# Patient Record
Sex: MALE | Race: WHITE | NOT HISPANIC OR LATINO | Employment: OTHER | URBAN - METROPOLITAN AREA
[De-identification: names, ages, dates, MRNs, and addresses within clinical notes are randomized per-mention and may not be internally consistent; named-entity substitution may affect disease eponyms.]

---

## 2017-08-05 LAB
CREAT ?TM UR-SCNC: 194 UMOL/L
HBA1C MFR BLD HPLC: 7.3 %
MICROALBUMIN UR-MCNC: 0.8 MG/L (ref 0–20)
MICROALBUMIN/CREAT UR: 4 MG/G{CREAT}

## 2017-12-14 LAB — HBA1C MFR BLD HPLC: 7.7 %

## 2018-07-02 DIAGNOSIS — E11.8 TYPE 2 DIABETES MELLITUS WITH COMPLICATION, UNSPECIFIED WHETHER LONG TERM INSULIN USE: Primary | ICD-10-CM

## 2018-07-02 NOTE — TELEPHONE ENCOUNTER
Pt stopped in office to get renewal of Janumet tabs 50/1000 (NOT TIMED RELEASE) take 1 tablet 2x per day sent to [facility] 90 day supply  Appt 9/10/18 with Dr Lakshmi Vazquez   Thanks

## 2018-09-10 ENCOUNTER — OFFICE VISIT (OUTPATIENT)
Dept: ENDOCRINOLOGY | Facility: HOSPITAL | Age: 68
End: 2018-09-10
Payer: MEDICARE

## 2018-09-10 VITALS
HEIGHT: 72 IN | HEART RATE: 72 BPM | DIASTOLIC BLOOD PRESSURE: 88 MMHG | BODY MASS INDEX: 30.88 KG/M2 | SYSTOLIC BLOOD PRESSURE: 140 MMHG | WEIGHT: 228 LBS

## 2018-09-10 DIAGNOSIS — E11.65 TYPE 2 DIABETES MELLITUS WITH HYPERGLYCEMIA, WITHOUT LONG-TERM CURRENT USE OF INSULIN (HCC): Primary | ICD-10-CM

## 2018-09-10 DIAGNOSIS — E11.42 DIABETIC POLYNEUROPATHY ASSOCIATED WITH TYPE 2 DIABETES MELLITUS (HCC): ICD-10-CM

## 2018-09-10 DIAGNOSIS — E11.8 TYPE 2 DIABETES MELLITUS WITH COMPLICATION, UNSPECIFIED WHETHER LONG TERM INSULIN USE: ICD-10-CM

## 2018-09-10 PROBLEM — I10 HYPERTENSION: Status: ACTIVE | Noted: 2018-09-10

## 2018-09-10 PROCEDURE — 99204 OFFICE O/P NEW MOD 45 MIN: CPT | Performed by: INTERNAL MEDICINE

## 2018-09-10 RX ORDER — CHLORAL HYDRATE 500 MG
1000 CAPSULE ORAL DAILY
COMMUNITY

## 2018-09-10 RX ORDER — AMLODIPINE BESYLATE 5 MG/1
5 TABLET ORAL 2 TIMES DAILY
COMMUNITY

## 2018-09-10 NOTE — LETTER
September 10, 2018     Darrick Sidhu MD  [address] [facility] [address]    Patient: Cody Tsai   YOB: 1950   Date of Visit: 9/10/2018       Dear Dr Sisto Brunner: Thank you for referring Cody Tsai to me for evaluation  Below are my notes for this consultation  If you have questions, please do not hesitate to call me  I look forward to following your patient along with you  Sincerely,        Abundio Meyer MD        CC: No Recipients  Abundio Meyer MD  9/10/2018  4:35 PM  Sign at close encounter  9/10/2018    Assessment/Plan      Diagnoses and all orders for this visit:    Type 2 diabetes mellitus with hyperglycemia, without long-term current use of insulin (HCC)  -     HEMOGLOBIN A1C W/ EAG ESTIMATION Lab Collect  -     Comprehensive metabolic panel Lab Collect  -     TSH, 3rd generation Lab Collect  -     Microalbumin / creatinine urine ratio Lab Collect  -     sitaGLIPtin-metFORMIN (JANUMET)  MG per tablet; Take 1 tablet by mouth daily at bedtime  -     ESTEFANI CONTOUR TEST test strip; Use as instructed    Type 2 diabetes mellitus with complication, unspecified whether long term insulin use (HCC)  -     sitaGLIPtin-metFORMIN (JANUMET)  MG per tablet; Take 1 tablet by mouth daily in the early morning    Diabetic polyneuropathy associated with type 2 diabetes mellitus ([facility] [address] )    Other orders  -     Discontinue: sitaGLIPtin-metFORMIN (JANUMET)  MG per tablet; Take 1 tablet by mouth daily at bedtime  -     amLODIPine (NORVASC) 5 mg tablet; Take 5 mg by mouth daily  -     Probiotic Product (PROBIOTIC-10 PO); Take by mouth daily    -     Omega-3 Fatty Acids (FISH OIL) 1,000 mg; Take 1,000 mg by mouth daily  -     Multiple Vitamin (MULTI-VITAMIN DAILY PO); Take by mouth daily          Assessment/Plan:  1  Type 2 diabetes  I have no recent blood work  [facility]    I will do a hemoglobin A1c, CMP, TSH, and urine microalbumin to creatinine ratio now  He will call within 1 week after the blood work for the results  We will adjust any medications as needed  He will continue to check his blood sugars once a day  I encouraged him to exercise at least 15-20 minutes 3-4 days a week  I asked him to be sure that when he does have a dessert that his portion of dessert is a small portion and not a larger portion in that he does not get 2 servings  I have asked him to increase his water intake to 6-8 8 oz glasses of water a day to keep his blood sugars down and improve his fatigue  2   Diabetic neuropathy  He has no symptoms that he complains of, just decreased vibration sensation on exam   I will follow this over time  I have asked him to follow up in 3 months and blood work will be determined based on the blood work that he does now  CC: Diabetes Consult    History of Present Illness     HPI: Jacques Hopper is a 79y o  year old male with type 2 diabetes for 18 years  He was previously seen by Dr Orr and is here for transition of care  He was diagnosed with diabetes in 2000 after his dog passed away  He had no treatment until 2008 when he had significant symptoms including dizziness  He was first placed on metformin, dose was adjusted and now he is taking Januvia too  He was never on insulin  He tried actos and it caused significant GI issues  He is on oral agents at home and takes Janumet 50/1000 mg in the morning and Janumet 50/500 mg in the evening  He denies any polyphagia, polydipsia, nocturia and blurry vision  He has polyuria  He denies numbness or tingling of the feet  He denies chest pain or shortness of breath  He denies neuropathy, nephropathy, retinopathy, heart attack, stroke and claudication but does admit to none  He has neck and throat pain and achiness, he says he felt 2 bones in throat  He has a cough for the last 6 months  His PCP ordered a thyroid ultrasound and chest x ray    Eats a healthy diet, one dessert a day  Not many carbs  Hypoglycemic episodes: No never  H/o of hypoglycemia causing hospitalization or intervention such as glucagon injection  or ambulance call  No   Hypoglycemia symptoms: never had one  Treatment of hypoglycemia:  Would drink juice or eat food  Glucagon:No   Medic alert tag: recommended,Yes  The patient's last eye exam was in July 2017 with no retinopathy  The patient's last foot exam was in not seen  Last A1C was done in December of 2017 and was  Lab Results   Component Value Date    HGBA1C 7 7 12/14/2017     Blood Sugar/Glucometer/Pump/CGM review: checks blood sugars once a day in am  NO record brought with him today  Before breakfast: 150- high 200s  Before lunch:   Before dinner:   Bedtime:     Review of Systems   Constitutional: Positive for fatigue  Negative for unexpected weight change  Gets tired at night  Not much exercise  HENT: Negative for hearing loss, tinnitus and trouble swallowing  No XRT to the head or neck in the past    Eyes: Negative for visual disturbance  Some blurry vision  Respiratory: Positive for cough  Negative for chest tightness and shortness of breath  Cough for 6 months  Cardiovascular: Negative for chest pain  Gastrointestinal: Positive for abdominal distention  Negative for abdominal pain, constipation, diarrhea and nausea  Always feeling bloated and upper abdomen uncomfortable  Endocrine: Positive for polyuria  Negative for polydipsia and polyphagia  No nocturia  Musculoskeletal: Positive for back pain  Negative for arthralgias  Some low back pain  Skin: Negative for rash and wound  Neurological: Positive for dizziness  Negative for tremors, light-headedness, numbness and headaches  Dizzy at times  Some tremors at times  Psychiatric/Behavioral: Negative for dysphoric mood and sleep disturbance  The patient is not nervous/anxious           Will wake early at times        Historical Information   Past Medical History:   Diagnosis Date    Hiatal hernia      No past surgical history on file  Social History   History   Alcohol Use    Yes     Comment: social     History   Drug Use No     History   Smoking Status    Former Smoker    Packs/day: 0 10    Types: Cigarettes    Quit date: 2018   Smokeless Tobacco    Never Used     Family History:   Family History   Problem Relation Age of Onset    Hypertension Mother     Heart disease Father         lung collapsed, several heart attacks    Diabetes type II Sister     Hypertension Sister     Diabetes type II Brother     Heart disease Brother     Hypertension Brother     Diabetes type II Sister     Hypertension Sister     Lucio's disease Sister     Hypertension Sister     Hypertension Sister     Hypertension Sister        Meds/Allergies   Current Outpatient Prescriptions   Medication Sig Dispense Refill    amLODIPine (NORVASC) 5 mg tablet Take 5 mg by mouth daily      Multiple Vitamin (MULTI-VITAMIN DAILY PO) Take by mouth daily        Omega-3 Fatty Acids (FISH OIL) 1,000 mg Take 1,000 mg by mouth daily      Probiotic Product (PROBIOTIC-10 PO) Take by mouth daily        sitaGLIPtin-metFORMIN (JANUMET)  MG per tablet Take 1 tablet by mouth daily in the early morning 90 tablet 3    sitaGLIPtin-metFORMIN (JANUMET)  MG per tablet Take 1 tablet by mouth daily at bedtime 90 tablet 3    ESTEFANI CONTOUR TEST test strip Use as instructed 100 each 3     No current facility-administered medications for this visit  Allergies   Allergen Reactions    Actos [Pioglitazone] GI Intolerance     Significant GI upset       Objective   Vitals: Blood pressure 140/88, pulse 72, height 6' (1 829 m), weight 103 kg (228 lb)  Invasive Devices          No matching active lines, drains, or airways          Physical Exam   Constitutional: He is oriented to person, place, and time   He appears well-developed and well-nourished  HENT:   Head: Normocephalic and atraumatic  Eyes: Conjunctivae and EOM are normal  Pupils are equal, round, and reactive to light  No lid lag, stare, proptosis, or periorbital edema  Neck: Normal range of motion  Neck supple  No thyromegaly present  No carotid bruits  Cardiovascular: Normal rate, regular rhythm, normal heart sounds and intact distal pulses  Pulses are no weak pulses  No murmur heard  Pulses:       Dorsalis pedis pulses are 2+ on the right side, and 2+ on the left side  Posterior tibial pulses are 2+ on the right side, and 2+ on the left side  Pulmonary/Chest: Effort normal and breath sounds normal  He has no wheezes  Abdominal: Soft  Bowel sounds are normal  There is no tenderness  Musculoskeletal: Normal range of motion  He exhibits no edema or deformity  Callus medial 1st toe bilateral   No ulceration of the feet when examined with shoes and socks removed  No tremor of the outstretched hands  No spinous process tenderness  No CVA tenderness  Feet:   Right Foot:   Skin Integrity: Positive for callus  Negative for ulcer, skin breakdown, erythema, warmth or dry skin  Left Foot:   Skin Integrity: Positive for callus  Negative for ulcer, skin breakdown, erythema, warmth or dry skin  Lymphadenopathy:     He has no cervical adenopathy  Neurological: He is alert and oriented to person, place, and time  He has normal reflexes  Vibration sensation diminished to the bilateral 1st toe DIP joint  Microfilament sensation intact bilaterally except decreased to the heels  Achilles tendon reflex intact bilaterally  Skin: Skin is warm and dry  No rash noted  No erythema  Vitals reviewed  Patient's shoes and socks removed  Right Foot/Ankle   Right Foot Inspection  Skin Exam: skin normal, skin intact, callus and callus no dry skin, no warmth, no erythema, no maceration, no abnormal color, no pre-ulcer and no ulcer                          Toe Exam: ROM and strength within normal limits  Sensory   Vibration: diminished  Proprioception: intact   Monofilament testing: intact  Vascular  Capillary refills: < 3 seconds  The right DP pulse is 2+  The right PT pulse is 2+  Left Foot/Ankle  Left Foot Inspection  Skin Exam: skin normal, skin intact and callusno dry skin, no warmth, no erythema, no maceration, normal color, no pre-ulcer and no ulcer                         Toe Exam: ROM and strength within normal limits                   Sensory   Vibration: diminished  Proprioception: intact  Monofilament: intact  Vascular  Capillary refills: < 3 seconds  The left DP pulse is 2+  The left PT pulse is 2+  Assign Risk Category:  No deformity present; No loss of protective sensation; No weak pulses       Risk: 0      The history was obtained from the review of the chart and from the patient and wife  Lab Results:    Most recent Alc is  Lab Results   Component Value Date    HGBA1C 7 7 12/14/2017               Recent Results (from the past 85792 hour(s))   HEMOGLOBIN A1C W/ EAG ESTIMATION    Collection Time: 08/05/17 12:00 AM   Result Value Ref Range    Hemoglobin A1C 7 3    Microalbumin / creatinine urine ratio    Collection Time: 08/05/17 12:00 AM   Result Value Ref Range    EXT Creatinine Urine 194     Microalbum  ,U,Random 0 8 0 0 - 20 0 mg/L    EXTERNAL Microalb/Creat Ratio 4    HEMOGLOBIN A1C W/ EAG ESTIMATION    Collection Time: 12/14/17 12:00 AM   Result Value Ref Range    Hemoglobin A1C 7 7          Future Appointments  Date Time Provider Lloyd Gee   1/8/2019 1:40 PM Shannon Chavis MD [facility]

## 2018-09-10 NOTE — PROGRESS NOTES
9/10/2018    Assessment/Plan      Diagnoses and all orders for this visit:    Type 2 diabetes mellitus with hyperglycemia, without long-term current use of insulin (HCC)  -     HEMOGLOBIN A1C W/ EAG ESTIMATION Lab Collect  -     Comprehensive metabolic panel Lab Collect  -     TSH, 3rd generation Lab Collect  -     Microalbumin / creatinine urine ratio Lab Collect  -     sitaGLIPtin-metFORMIN (JANUMET)  MG per tablet; Take 1 tablet by mouth daily at bedtime  -     ESTEFANI CONTOUR TEST test strip; Use as instructed    Type 2 diabetes mellitus with complication, unspecified whether long term insulin use (HCC)  -     sitaGLIPtin-metFORMIN (JANUMET)  MG per tablet; Take 1 tablet by mouth daily in the early morning    Diabetic polyneuropathy associated with type 2 diabetes mellitus ([facility] [address] )    Other orders  -     Discontinue: sitaGLIPtin-metFORMIN (JANUMET)  MG per tablet; Take 1 tablet by mouth daily at bedtime  -     amLODIPine (NORVASC) 5 mg tablet; Take 5 mg by mouth daily  -     Probiotic Product (PROBIOTIC-10 PO); Take by mouth daily    -     Omega-3 Fatty Acids (FISH OIL) 1,000 mg; Take 1,000 mg by mouth daily  -     Multiple Vitamin (MULTI-VITAMIN DAILY PO); Take by mouth daily          Assessment/Plan:  1  Type 2 diabetes  I have no recent blood work  [facility] now, [facility]  I will do a hemoglobin A1c, CMP, TSH, and urine microalbumin to creatinine ratio now  He will call within 1 week after the blood work for the results  We will adjust any medications as needed  He will continue to check his blood sugars once a day  I encouraged him to exercise at least 15-20 minutes 3-4 days a week  I asked him to be sure that when he does have a dessert that his portion of dessert is a small portion and not a larger portion in that he does not get 2 servings    I have asked him to increase his water intake to 6-8 8 oz glasses of water a day to keep his blood sugars down and improve his fatigue  2   Diabetic neuropathy  He has no symptoms that he complains of, just decreased vibration sensation on exam   I will follow this over time  I have asked him to follow up in 3 months and blood work will be determined based on the blood work that he does now  CC: Diabetes Consult    History of Present Illness     HPI: Mukund Fine is a 79y o  year old male with type 2 diabetes for 18 years  He was previously seen by Dr Orr and is here for transition of care  He was diagnosed with diabetes in 2000 after his dog passed away  He had no treatment until 2008 when he had significant symptoms including dizziness  He was first placed on metformin, dose was adjusted and now he is taking Januvia too  He was never on insulin  He tried actos and it caused significant GI issues  He is on oral agents at home and takes Janumet 50/1000 mg in the morning and Janumet 50/500 mg in the evening  He denies any polyphagia, polydipsia, nocturia and blurry vision  He has polyuria  He denies numbness or tingling of the feet  He denies chest pain or shortness of breath  He denies neuropathy, nephropathy, retinopathy, heart attack, stroke and claudication but does admit to none  He has neck and throat pain and achiness, he says he felt 2 bones in throat  He has a cough for the last 6 months  His PCP ordered a thyroid ultrasound and chest x ray  Eats a healthy diet, one dessert a day  Not many carbs  Hypoglycemic episodes: No never  H/o of hypoglycemia causing hospitalization or intervention such as glucagon injection  or ambulance call  No   Hypoglycemia symptoms: never had one  Treatment of hypoglycemia:  Would drink juice or eat food  Glucagon:No   Medic alert tag: recommended,Yes  The patient's last eye exam was in July 2017 with no retinopathy  The patient's last foot exam was in not seen   Last A1C was done in December of 2017 and was  Lab Results   Component Value Date    HGBA1C 7 7 12/14/2017     Blood Sugar/Glucometer/Pump/CGM review: checks blood sugars once a day in am  NO record brought with him today  Before breakfast: 150- high 200s  Before lunch:   Before dinner:   Bedtime:     Review of Systems   Constitutional: Positive for fatigue  Negative for unexpected weight change  Gets tired at night  Not much exercise  HENT: Negative for hearing loss, tinnitus and trouble swallowing  No XRT to the head or neck in the past    Eyes: Negative for visual disturbance  Some blurry vision  Respiratory: Positive for cough  Negative for chest tightness and shortness of breath  Cough for 6 months  Cardiovascular: Negative for chest pain  Gastrointestinal: Positive for abdominal distention  Negative for abdominal pain, constipation, diarrhea and nausea  Always feeling bloated and upper abdomen uncomfortable  Endocrine: Positive for polyuria  Negative for polydipsia and polyphagia  No nocturia  Musculoskeletal: Positive for back pain  Negative for arthralgias  Some low back pain  Skin: Negative for rash and wound  Neurological: Positive for dizziness  Negative for tremors, light-headedness, numbness and headaches  Dizzy at times  Some tremors at times  Psychiatric/Behavioral: Negative for dysphoric mood and sleep disturbance  The patient is not nervous/anxious  Will wake early at times  Historical Information   Past Medical History:   Diagnosis Date    Hiatal hernia      No past surgical history on file    Social History   History   Alcohol Use    Yes     Comment: social     History   Drug Use No     History   Smoking Status    Former Smoker    Packs/day: 0 10    Types: Cigarettes    Quit date: 2018   Smokeless Tobacco    Never Used     Family History:   Family History   Problem Relation Age of Onset    Hypertension Mother     Heart disease Father         lung collapsed, several heart attacks    Diabetes type II Sister     Hypertension Sister     Diabetes type II Brother     Heart disease Brother     Hypertension Brother     Diabetes type II Sister     Hypertension Sister     Lucio's disease Sister     Hypertension Sister     Hypertension Sister     Hypertension Sister        Meds/Allergies   Current Outpatient Prescriptions   Medication Sig Dispense Refill    amLODIPine (NORVASC) 5 mg tablet Take 5 mg by mouth daily      Multiple Vitamin (MULTI-VITAMIN DAILY PO) Take by mouth daily        Omega-3 Fatty Acids (FISH OIL) 1,000 mg Take 1,000 mg by mouth daily      Probiotic Product (PROBIOTIC-10 PO) Take by mouth daily        sitaGLIPtin-metFORMIN (JANUMET)  MG per tablet Take 1 tablet by mouth daily in the early morning 90 tablet 3    sitaGLIPtin-metFORMIN (JANUMET)  MG per tablet Take 1 tablet by mouth daily at bedtime 90 tablet 3    ESTEFANI CONTOUR TEST test strip Use as instructed 100 each 3     No current facility-administered medications for this visit  Allergies   Allergen Reactions    Actos [Pioglitazone] GI Intolerance     Significant GI upset       Objective   Vitals: Blood pressure 140/88, pulse 72, height 6' (1 829 m), weight 103 kg (228 lb)  Invasive Devices          No matching active lines, drains, or airways          Physical Exam   Constitutional: He is oriented to person, place, and time  He appears well-developed and well-nourished  HENT:   Head: Normocephalic and atraumatic  Eyes: Conjunctivae and EOM are normal  Pupils are equal, round, and reactive to light  No lid lag, stare, proptosis, or periorbital edema  Neck: Normal range of motion  Neck supple  No thyromegaly present  No carotid bruits  Cardiovascular: Normal rate, regular rhythm, normal heart sounds and intact distal pulses  Pulses are no weak pulses  No murmur heard  Pulses:       Dorsalis pedis pulses are 2+ on the right side, and 2+ on the left side          Posterior tibial pulses are 2+ on the right side, and 2+ on the left side  Pulmonary/Chest: Effort normal and breath sounds normal  He has no wheezes  Abdominal: Soft  Bowel sounds are normal  There is no tenderness  Musculoskeletal: Normal range of motion  He exhibits no edema or deformity  Callus medial 1st toe bilateral   No ulceration of the feet when examined with shoes and socks removed  No tremor of the outstretched hands  No spinous process tenderness  No CVA tenderness  Feet:   Right Foot:   Skin Integrity: Positive for callus  Negative for ulcer, skin breakdown, erythema, warmth or dry skin  Left Foot:   Skin Integrity: Positive for callus  Negative for ulcer, skin breakdown, erythema, warmth or dry skin  Lymphadenopathy:     He has no cervical adenopathy  Neurological: He is alert and oriented to person, place, and time  He has normal reflexes  Vibration sensation diminished to the bilateral 1st toe DIP joint  Microfilament sensation intact bilaterally except decreased to the heels  Achilles tendon reflex intact bilaterally  Skin: Skin is warm and dry  No rash noted  No erythema  Vitals reviewed  Patient's shoes and socks removed  Right Foot/Ankle   Right Foot Inspection  Skin Exam: skin normal, skin intact, callus and callus no dry skin, no warmth, no erythema, no maceration, no abnormal color, no pre-ulcer and no ulcer                          Toe Exam: ROM and strength within normal limits  Sensory   Vibration: diminished  Proprioception: intact   Monofilament testing: intact  Vascular  Capillary refills: < 3 seconds  The right DP pulse is 2+  The right PT pulse is 2+       Left Foot/Ankle  Left Foot Inspection  Skin Exam: skin normal, skin intact and callusno dry skin, no warmth, no erythema, no maceration, normal color, no pre-ulcer and no ulcer                         Toe Exam: ROM and strength within normal limits                   Sensory   Vibration: diminished  Proprioception: intact  Monofilament: intact  Vascular  Capillary refills: < 3 seconds  The left DP pulse is 2+  The left PT pulse is 2+  Assign Risk Category:  No deformity present; No loss of protective sensation; No weak pulses       Risk: 0      The history was obtained from the review of the chart and from the patient and wife  Lab Results:    Most recent Alc is  Lab Results   Component Value Date    HGBA1C 7 7 12/14/2017               Recent Results (from the past 75915 hour(s))   HEMOGLOBIN A1C W/ EAG ESTIMATION    Collection Time: 08/05/17 12:00 AM   Result Value Ref Range    Hemoglobin A1C 7 3    Microalbumin / creatinine urine ratio    Collection Time: 08/05/17 12:00 AM   Result Value Ref Range    EXT Creatinine Urine 194     Microalbum  ,U,Random 0 8 0 0 - 20 0 mg/L    EXTERNAL Microalb/Creat Ratio 4    HEMOGLOBIN A1C W/ EAG ESTIMATION    Collection Time: 12/14/17 12:00 AM   Result Value Ref Range    Hemoglobin A1C 7 7          Future Appointments  Date Time Provider Lloyd Gee   1/8/2019 1:40 PM Aida Faith MD [facility]

## 2018-09-10 NOTE — PATIENT INSTRUCTIONS
Do blood work now  Call within 1 week of the blood work for results  Continue the same Janumet  Work on exercise 15-30 min 3-4 times a week  Work on 6-8 8 ounce glasses of water a day  Keep sweets portions size smaller  Follow in 4 months with blood work

## 2018-09-14 LAB
ALBUMIN SERPL-MCNC: 4.5 G/DL (ref 3.6–5.1)
ALBUMIN/CREAT UR: 4 MCG/MG CREAT
ALBUMIN/GLOB SERPL: 1.9 (CALC) (ref 1–2.5)
ALP SERPL-CCNC: 56 U/L (ref 40–115)
ALT SERPL-CCNC: 30 U/L (ref 9–46)
AST SERPL-CCNC: 20 U/L (ref 10–35)
BILIRUB SERPL-MCNC: 0.5 MG/DL (ref 0.2–1.2)
BUN SERPL-MCNC: 11 MG/DL (ref 7–25)
BUN/CREAT SERPL: ABNORMAL (CALC) (ref 6–22)
CALCIUM SERPL-MCNC: 9.6 MG/DL (ref 8.6–10.3)
CHLORIDE SERPL-SCNC: 103 MMOL/L (ref 98–110)
CO2 SERPL-SCNC: 27 MMOL/L (ref 20–32)
CREAT SERPL-MCNC: 0.83 MG/DL (ref 0.7–1.25)
CREAT UR-MCNC: 71 MG/DL (ref 20–320)
EST. AVERAGE GLUCOSE BLD GHB EST-MCNC: 157 (CALC)
EST. AVERAGE GLUCOSE BLD GHB EST-SCNC: 8.7 (CALC)
GLOBULIN SER CALC-MCNC: 2.4 G/DL (CALC) (ref 1.9–3.7)
GLUCOSE SERPL-MCNC: 156 MG/DL (ref 65–99)
HBA1C MFR BLD: 7.1 % OF TOTAL HGB
MICROALBUMIN UR-MCNC: 0.3 MG/DL
POTASSIUM SERPL-SCNC: 4.2 MMOL/L (ref 3.5–5.3)
PROT SERPL-MCNC: 6.9 G/DL (ref 6.1–8.1)
SL AMB EGFR AFRICAN AMERICAN: 106 ML/MIN/1.73M2
SL AMB EGFR NON AFRICAN AMERICAN: 92 ML/MIN/1.73M2
SODIUM SERPL-SCNC: 137 MMOL/L (ref 135–146)
TSH SERPL-ACNC: 5.71 MIU/L (ref 0.4–4.5)

## 2018-09-17 ENCOUNTER — TELEPHONE (OUTPATIENT)
Dept: ENDOCRINOLOGY | Facility: HOSPITAL | Age: 68
End: 2018-09-17

## 2018-10-31 DIAGNOSIS — E11.8 TYPE 2 DIABETES MELLITUS WITH COMPLICATION, UNSPECIFIED WHETHER LONG TERM INSULIN USE: Primary | ICD-10-CM

## 2018-10-31 NOTE — TELEPHONE ENCOUNTER
Pt stopped by the office today said that he needs a new script for onetouch ultra strips, he test 2x a day and would like a 90 day supply sent to [facility] in [facility]

## 2019-01-31 DIAGNOSIS — E11.8 TYPE 2 DIABETES MELLITUS WITH COMPLICATION, UNSPECIFIED WHETHER LONG TERM INSULIN USE: ICD-10-CM

## 2019-03-26 ENCOUNTER — TELEPHONE (OUTPATIENT)
Dept: ENDOCRINOLOGY | Facility: HOSPITAL | Age: 69
End: 2019-03-26

## 2019-03-26 DIAGNOSIS — E11.65 TYPE 2 DIABETES MELLITUS WITH HYPERGLYCEMIA, WITHOUT LONG-TERM CURRENT USE OF INSULIN (HCC): Primary | ICD-10-CM

## 2019-03-26 DIAGNOSIS — E11.42 DIABETIC POLYNEUROPATHY ASSOCIATED WITH TYPE 2 DIABETES MELLITUS (HCC): ICD-10-CM

## 2019-03-26 DIAGNOSIS — R79.89 ELEVATED TSH: ICD-10-CM

## 2019-03-26 DIAGNOSIS — I10 ESSENTIAL HYPERTENSION: ICD-10-CM

## 2019-03-26 NOTE — TELEPHONE ENCOUNTER
Pt has an appt with you on 4/2  Wife wants to know what labs you want ordered  Pt has previous labs in Epic

## 2019-04-01 LAB
ALBUMIN SERPL-MCNC: 4.6 G/DL (ref 3.6–5.1)
ALBUMIN/GLOB SERPL: 1.8 (CALC) (ref 1–2.5)
ALP SERPL-CCNC: 59 U/L (ref 40–115)
ALT SERPL-CCNC: 22 U/L (ref 9–46)
AST SERPL-CCNC: 17 U/L (ref 10–35)
BILIRUB SERPL-MCNC: 0.5 MG/DL (ref 0.2–1.2)
BUN SERPL-MCNC: 13 MG/DL (ref 7–25)
BUN/CREAT SERPL: ABNORMAL (CALC) (ref 6–22)
CALCIUM SERPL-MCNC: 9.8 MG/DL (ref 8.6–10.3)
CHLORIDE SERPL-SCNC: 105 MMOL/L (ref 98–110)
CHOLEST SERPL-MCNC: 170 MG/DL
CHOLEST/HDLC SERPL: 4.4 (CALC)
CO2 SERPL-SCNC: 25 MMOL/L (ref 20–32)
CREAT SERPL-MCNC: 0.86 MG/DL (ref 0.7–1.25)
EST. AVERAGE GLUCOSE BLD GHB EST-MCNC: 186 (CALC)
EST. AVERAGE GLUCOSE BLD GHB EST-SCNC: 10.3 (CALC)
GLOBULIN SER CALC-MCNC: 2.5 G/DL (CALC) (ref 1.9–3.7)
GLUCOSE SERPL-MCNC: 170 MG/DL (ref 65–99)
HBA1C MFR BLD: 8.1 % OF TOTAL HGB
HDLC SERPL-MCNC: 39 MG/DL
LDLC SERPL CALC-MCNC: 109 MG/DL (CALC)
NONHDLC SERPL-MCNC: 131 MG/DL (CALC)
POTASSIUM SERPL-SCNC: 4.4 MMOL/L (ref 3.5–5.3)
PROT SERPL-MCNC: 7.1 G/DL (ref 6.1–8.1)
SL AMB EGFR AFRICAN AMERICAN: 105 ML/MIN/1.73M2
SL AMB EGFR NON AFRICAN AMERICAN: 90 ML/MIN/1.73M2
SODIUM SERPL-SCNC: 139 MMOL/L (ref 135–146)
T4 FREE SERPL-MCNC: 1.2 NG/DL (ref 0.8–1.8)
THYROGLOB AB SERPL-ACNC: 24 IU/ML
THYROPEROXIDASE AB SERPL-ACNC: 9 IU/ML
TRIGL SERPL-MCNC: 110 MG/DL
TSH SERPL-ACNC: 4.21 MIU/L (ref 0.4–4.5)

## 2019-04-02 ENCOUNTER — OFFICE VISIT (OUTPATIENT)
Dept: ENDOCRINOLOGY | Facility: HOSPITAL | Age: 69
End: 2019-04-02
Payer: MEDICARE

## 2019-04-02 VITALS
HEART RATE: 68 BPM | BODY MASS INDEX: 30.8 KG/M2 | DIASTOLIC BLOOD PRESSURE: 68 MMHG | SYSTOLIC BLOOD PRESSURE: 136 MMHG | HEIGHT: 72 IN | WEIGHT: 227.4 LBS

## 2019-04-02 DIAGNOSIS — R79.89 ELEVATED TSH: ICD-10-CM

## 2019-04-02 DIAGNOSIS — I10 ESSENTIAL HYPERTENSION: ICD-10-CM

## 2019-04-02 DIAGNOSIS — E11.65 TYPE 2 DIABETES MELLITUS WITH HYPERGLYCEMIA, WITHOUT LONG-TERM CURRENT USE OF INSULIN (HCC): Primary | ICD-10-CM

## 2019-04-02 DIAGNOSIS — E11.42 DIABETIC POLYNEUROPATHY ASSOCIATED WITH TYPE 2 DIABETES MELLITUS (HCC): ICD-10-CM

## 2019-04-02 DIAGNOSIS — E06.3 HASHIMOTO'S THYROIDITIS: ICD-10-CM

## 2019-04-02 DIAGNOSIS — E11.8 TYPE 2 DIABETES MELLITUS WITH COMPLICATION, UNSPECIFIED WHETHER LONG TERM INSULIN USE: ICD-10-CM

## 2019-04-02 PROCEDURE — 99215 OFFICE O/P EST HI 40 MIN: CPT | Performed by: INTERNAL MEDICINE

## 2019-04-02 RX ORDER — LECITHIN/GINKGO/S.GINSENG/GOTU 50-150-250
TABLET ORAL
COMMUNITY
End: 2019-07-10 | Stop reason: ALTCHOICE

## 2019-05-20 DIAGNOSIS — E11.8 TYPE 2 DIABETES MELLITUS WITH COMPLICATION, UNSPECIFIED WHETHER LONG TERM INSULIN USE: ICD-10-CM

## 2019-07-10 ENCOUNTER — OFFICE VISIT (OUTPATIENT)
Dept: ENDOCRINOLOGY | Facility: HOSPITAL | Age: 69
End: 2019-07-10
Payer: MEDICARE

## 2019-07-10 VITALS
SYSTOLIC BLOOD PRESSURE: 134 MMHG | BODY MASS INDEX: 30.58 KG/M2 | DIASTOLIC BLOOD PRESSURE: 82 MMHG | WEIGHT: 225.8 LBS | HEIGHT: 72 IN | HEART RATE: 67 BPM

## 2019-07-10 DIAGNOSIS — E11.65 TYPE 2 DIABETES MELLITUS WITH HYPERGLYCEMIA, WITHOUT LONG-TERM CURRENT USE OF INSULIN (HCC): Primary | ICD-10-CM

## 2019-07-10 DIAGNOSIS — I10 ESSENTIAL HYPERTENSION: ICD-10-CM

## 2019-07-10 DIAGNOSIS — E06.3 HASHIMOTO'S THYROIDITIS: ICD-10-CM

## 2019-07-10 DIAGNOSIS — E11.42 DIABETIC POLYNEUROPATHY ASSOCIATED WITH TYPE 2 DIABETES MELLITUS (HCC): ICD-10-CM

## 2019-07-10 PROBLEM — R79.89 ELEVATED TSH: Status: RESOLVED | Noted: 2019-03-26 | Resolved: 2019-07-10

## 2019-07-10 LAB
ALBUMIN SERPL-MCNC: 4.1 G/DL (ref 3.6–5.1)
ALBUMIN/GLOB SERPL: 1.6 (CALC) (ref 1–2.5)
ALP SERPL-CCNC: 64 U/L (ref 40–115)
ALT SERPL-CCNC: 24 U/L (ref 9–46)
AST SERPL-CCNC: 17 U/L (ref 10–35)
BILIRUB SERPL-MCNC: 0.5 MG/DL (ref 0.2–1.2)
BUN SERPL-MCNC: 12 MG/DL (ref 7–25)
BUN/CREAT SERPL: ABNORMAL (CALC) (ref 6–22)
CALCIUM SERPL-MCNC: 9.3 MG/DL (ref 8.6–10.3)
CHLORIDE SERPL-SCNC: 105 MMOL/L (ref 98–110)
CO2 SERPL-SCNC: 28 MMOL/L (ref 20–32)
CREAT SERPL-MCNC: 0.84 MG/DL (ref 0.7–1.25)
EST. AVERAGE GLUCOSE BLD GHB EST-MCNC: 169 (CALC)
EST. AVERAGE GLUCOSE BLD GHB EST-SCNC: 9.3 (CALC)
GLOBULIN SER CALC-MCNC: 2.6 G/DL (CALC) (ref 1.9–3.7)
GLUCOSE SERPL-MCNC: 157 MG/DL (ref 65–99)
HBA1C MFR BLD: 7.5 % OF TOTAL HGB
POTASSIUM SERPL-SCNC: 4.4 MMOL/L (ref 3.5–5.3)
PROT SERPL-MCNC: 6.7 G/DL (ref 6.1–8.1)
SL AMB EGFR AFRICAN AMERICAN: 106 ML/MIN/1.73M2
SL AMB EGFR NON AFRICAN AMERICAN: 91 ML/MIN/1.73M2
SODIUM SERPL-SCNC: 139 MMOL/L (ref 135–146)
T4 FREE SERPL-MCNC: 1.1 NG/DL (ref 0.8–1.8)
TSH SERPL-ACNC: 3.46 MIU/L (ref 0.4–4.5)

## 2019-07-10 PROCEDURE — 99215 OFFICE O/P EST HI 40 MIN: CPT | Performed by: INTERNAL MEDICINE

## 2019-07-10 NOTE — PATIENT INSTRUCTIONS
hgba1c is 7 5%  This is better  No change for now in the janumet  Thyroid test is back to normal  Liver and kidney function are normal   Continue to check blood sugars once a day  Follow up in 3 months with blood work

## 2019-07-10 NOTE — PROGRESS NOTES
7/10/2019    Assessment/Plan      Diagnoses and all orders for this visit:    Type 2 diabetes mellitus with hyperglycemia, without long-term current use of insulin ([facility] [address] )  -     HEMOGLOBIN A1C W/ EAG ESTIMATION Lab Collect; Future  -     Comprehensive metabolic panel Lab Collect; Future  -     Microalbumin / creatinine urine ratio Lab Collect; Future    Diabetic polyneuropathy associated with type 2 diabetes mellitus (HCC)  -     HEMOGLOBIN A1C W/ EAG ESTIMATION Lab Collect; Future  -     Comprehensive metabolic panel Lab Collect; Future  -     Microalbumin / creatinine urine ratio Lab Collect; Future    Hashimoto's thyroiditis  -     HEMOGLOBIN A1C W/ EAG ESTIMATION Lab Collect; Future  -     Comprehensive metabolic panel Lab Collect; Future  -     Microalbumin / creatinine urine ratio Lab Collect; Future    Essential hypertension  -     HEMOGLOBIN A1C W/ EAG ESTIMATION Lab Collect; Future  -     Comprehensive metabolic panel Lab Collect; Future  -     Microalbumin / creatinine urine ratio Lab Collect; Future    Other orders  -     Specialty Vitamins Products (LIPOTRIAD VISION SUPPORT PLUS PO); Take by mouth Vision support plus supplement once daily  -     Multiple Vitamins-Minerals (MEMORY SYED PO); Take by mouth Memory XR plus once daily        Assessment/Plan:    1  Type 2 diabetes  His most recent hemoglobin A1c is 7 5% which is continuing to improve  For now, he will continue the same Janumet as he works on dietary changes and exercise  He will continue to check his blood sugars at least once a day  It was recommended that he follow up with eye doctor for diabetic eye exam as he is overdue  2  Diabetic neuropathy  He has very minor neuropathic symptoms and diabetic foot exams are performed in the office on a regular basis  3  Hashimoto's thyroiditis  He has had elevated TSH in the past but that has resolved and normalized on its own  This will be followed over time  4  Hypertension    Blood pressure is under good control on his current dose of amlodipine  I have asked him to follow up in 3 months with preceding hemoglobin A1c, CMP, and urine microalbumin to creatinine ratio  CC: Diabetes Type 2, thyroid, blood pressure follow-up    History of Present Illness     HPI: Laura Rodarte is a 79y o  year old male with type 2 diabetes for 18 years  He is on oral agents at home and takes Janumet 50/1000 mg BID  He denies any polyuria, polydipsia, and polyphagia  He has nocturia once a night  He does have some blurry vision  He has occasional numbness of the legs that resolves with movement  He denies chest pain or shortness of breath  He denies nephropathy, retinopathy, heart attack, stroke and claudication but does admit to neuropathy  Hypoglycemic episodes: No never  The patient's last eye exam was over 1 year ago  The patient's last foot exam was in September at endocrine office visit  Last A1C was   Lab Results   Component Value Date    HGBA1C 7 5 (H) 07/09/2019     Blood Sugar/Glucometer/Pump/CGM review: checks blood sugar once a day  Checks in the am, feels that it is better  Reports int he low 200s, occasional 100s  Rae Anderson He has Hashimoto's thyroiditis and has had fluctuating TSH in the past   He is currently not on thyroid medication  He denies diarrhea or constipation, fatigue, insomnia, or weight changes  He has no tremors  He has no palpitations  He has hypertension and takes amlodipine 5 mg twice a day  He denies headache or stroke-like symptoms  Review of Systems   Constitutional: Negative for fatigue and unexpected weight change  HENT: Negative for trouble swallowing  Eyes: Positive for visual disturbance  Wears glasses  Has blurry vision  Respiratory: Negative for chest tightness and shortness of breath  Cardiovascular: Negative for chest pain  Gastrointestinal: Positive for abdominal distention   Negative for abdominal pain, constipation, diarrhea and nausea  To get GI evaluation with GI doc  Always feels bloated and feels full, gassy  Endocrine: Negative for polydipsia, polyphagia and polyuria  Nocturia 1 time a night as needed  Now urinating more during the day, thinks prostate enlarged  Skin: Negative for wound  Neurological: Positive for light-headedness  Negative for dizziness, weakness, numbness and headaches  Occasional numbness of the leg, resolved with movement  Lightheaded at times  Psychiatric/Behavioral: Negative for sleep disturbance  Historical Information   Past Medical History:   Diagnosis Date    Hiatal hernia      No past surgical history on file    Social History   Social History     Substance and Sexual Activity   Alcohol Use Yes    Comment: social     Social History     Substance and Sexual Activity   Drug Use No     Social History     Tobacco Use   Smoking Status Former Smoker    Packs/day: 0 10    Types: Cigarettes    Last attempt to quit: 2018    Years since quittin 5   Smokeless Tobacco Never Used     Family History:   Family History   Problem Relation Age of Onset    Hypertension Mother     Heart disease Father         lung collapsed, several heart attacks    Diabetes type II Sister     Hypertension Sister     Diabetes type II Brother     Heart disease Brother     Hypertension Brother     Diabetes type II Sister     Hypertension Sister     Rapides's disease Sister     Hypertension Sister     Hypertension Sister     Hypertension Sister        Meds/Allergies   Current Outpatient Medications   Medication Sig Dispense Refill    amLODIPine (NORVASC) 5 mg tablet Take 5 mg by mouth 2 (two) times a day       glucose blood test strip Check sugars one time a day 100 each 1    Multiple Vitamins-Minerals (MEMORY SYED PO) Take by mouth Memory XR plus once daily      Omega-3 Fatty Acids (FISH OIL) 1,000 mg Take 1,000 mg by mouth daily      sitaGLIPtin-metFORMIN (JANUMET)  MG per tablet Take 1 tablet by mouth 2 (two) times a day with meals 180 tablet 3    Specialty Vitamins Products (LIPOTRIAD VISION SUPPORT PLUS PO) Take by mouth Vision support plus supplement once daily       No current facility-administered medications for this visit  Allergies   Allergen Reactions    Actos [Pioglitazone] GI Intolerance     Significant GI upset       Objective   Vitals: Blood pressure 134/82, pulse 67, height 6' (1 829 m), weight 102 kg (225 lb 12 8 oz)  Invasive Devices     None                 Physical Exam   Constitutional: He is oriented to person, place, and time  He appears well-developed and well-nourished  HENT:   Head: Normocephalic and atraumatic  Eyes: Pupils are equal, round, and reactive to light  Conjunctivae and EOM are normal      No lid lag, stare, proptosis, or periorbital edema  Neck: Normal range of motion  Neck supple  No thyromegaly present  Thyroid normal in size without palpable thyroid nodules  No carotid bruits  Cardiovascular: Normal rate, regular rhythm, normal heart sounds and intact distal pulses  No murmur heard  Pulmonary/Chest: Effort normal and breath sounds normal  He has no wheezes  Abdominal: Soft  There is no tenderness  Musculoskeletal: Normal range of motion  He exhibits no edema or deformity  Callus on medial right 1st toe  No ulcerations of the feet  No tremor of the outstretched hands  Lymphadenopathy:     He has no cervical adenopathy  Neurological: He is alert and oriented to person, place, and time  He has normal reflexes  Deep tendon reflexes normal without briskness  Skin: Skin is warm and dry  No rash noted  No erythema  Vitals reviewed  The history was obtained from the review of the chart and from the patient      Lab Results:    Most recent Alc is  Lab Results   Component Value Date    HGBA1C 7 5 (H) 07/09/2019             CMP done on 07/09/2019 showed a glucose of 157 fasting but was otherwise normal   Lab Results   Component Value Date    CREATININE 0 84 07/09/2019    CREATININE 0 86 03/30/2019    CREATININE 0 83 09/13/2018    BUN 12 07/09/2019    K 4 4 07/09/2019     07/09/2019    CO2 28 07/09/2019     Lab Results   Component Value Date    ALT 24 07/09/2019    AST 17 07/09/2019    ALKPHOS 64 07/09/2019     Lab Results   Component Value Date    TSH 3 46 07/09/2019    FREET4 1 1 07/09/2019       Future Appointments   Date Time Provider Lloyd Cheisti   10/16/2019  2:45 PM MERLIN Desai [facility]

## 2019-08-13 LAB
LEFT EYE DIABETIC RETINOPATHY: NORMAL
RIGHT EYE DIABETIC RETINOPATHY: NORMAL

## 2019-11-11 DIAGNOSIS — E11.8 TYPE 2 DIABETES MELLITUS WITH COMPLICATION (HCC): ICD-10-CM

## 2019-11-11 RX ORDER — SITAGLIPTIN AND METFORMIN HYDROCHLORIDE 1000; 50 MG/1; MG/1
TABLET, FILM COATED ORAL
Qty: 90 TABLET | Refills: 4 | Status: SHIPPED | OUTPATIENT
Start: 2019-11-11 | End: 2020-01-27 | Stop reason: SDUPTHER

## 2019-11-16 DIAGNOSIS — E11.8 TYPE 2 DIABETES MELLITUS WITH COMPLICATION (HCC): ICD-10-CM

## 2020-01-22 LAB
ALBUMIN SERPL-MCNC: 4.5 G/DL (ref 3.6–5.1)
ALBUMIN/CREAT UR: 4 MCG/MG CREAT
ALBUMIN/GLOB SERPL: 1.8 (CALC) (ref 1–2.5)
ALP SERPL-CCNC: 56 U/L (ref 40–115)
ALT SERPL-CCNC: 18 U/L (ref 9–46)
AST SERPL-CCNC: 14 U/L (ref 10–35)
BILIRUB SERPL-MCNC: 0.5 MG/DL (ref 0.2–1.2)
BUN SERPL-MCNC: 12 MG/DL (ref 7–25)
BUN/CREAT SERPL: ABNORMAL (CALC) (ref 6–22)
CALCIUM SERPL-MCNC: 9.8 MG/DL (ref 8.6–10.3)
CHLORIDE SERPL-SCNC: 104 MMOL/L (ref 98–110)
CO2 SERPL-SCNC: 25 MMOL/L (ref 20–32)
CREAT SERPL-MCNC: 0.81 MG/DL (ref 0.7–1.25)
CREAT UR-MCNC: 151 MG/DL (ref 20–320)
EST. AVERAGE GLUCOSE BLD GHB EST-MCNC: 194 (CALC)
EST. AVERAGE GLUCOSE BLD GHB EST-SCNC: 10.8 (CALC)
GLOBULIN SER CALC-MCNC: 2.5 G/DL (CALC) (ref 1.9–3.7)
GLUCOSE SERPL-MCNC: 166 MG/DL (ref 65–99)
HBA1C MFR BLD: 8.4 % OF TOTAL HGB
MICROALBUMIN UR-MCNC: 0.6 MG/DL
POTASSIUM SERPL-SCNC: 4.4 MMOL/L (ref 3.5–5.3)
PROT SERPL-MCNC: 7 G/DL (ref 6.1–8.1)
SL AMB EGFR AFRICAN AMERICAN: 107 ML/MIN/1.73M2
SL AMB EGFR NON AFRICAN AMERICAN: 92 ML/MIN/1.73M2
SODIUM SERPL-SCNC: 139 MMOL/L (ref 135–146)

## 2020-01-27 ENCOUNTER — OFFICE VISIT (OUTPATIENT)
Dept: ENDOCRINOLOGY | Facility: HOSPITAL | Age: 70
End: 2020-01-27
Payer: MEDICARE

## 2020-01-27 VITALS
DIASTOLIC BLOOD PRESSURE: 80 MMHG | HEIGHT: 72 IN | BODY MASS INDEX: 31.42 KG/M2 | HEART RATE: 72 BPM | SYSTOLIC BLOOD PRESSURE: 126 MMHG | WEIGHT: 232 LBS

## 2020-01-27 DIAGNOSIS — E06.3 HASHIMOTO'S THYROIDITIS: ICD-10-CM

## 2020-01-27 DIAGNOSIS — E11.42 DIABETIC POLYNEUROPATHY ASSOCIATED WITH TYPE 2 DIABETES MELLITUS (HCC): ICD-10-CM

## 2020-01-27 DIAGNOSIS — I10 ESSENTIAL HYPERTENSION: ICD-10-CM

## 2020-01-27 DIAGNOSIS — E11.65 TYPE 2 DIABETES MELLITUS WITH HYPERGLYCEMIA, WITHOUT LONG-TERM CURRENT USE OF INSULIN (HCC): Primary | ICD-10-CM

## 2020-01-27 PROCEDURE — 99215 OFFICE O/P EST HI 40 MIN: CPT | Performed by: INTERNAL MEDICINE

## 2020-01-27 NOTE — PATIENT INSTRUCTIONS
hgba1c is 8 4%  This is too high  the liver and kidney function is normal  Urine test shows no diabetes effects on kidneys  Continue the same janumet 50/1000 mg in am and 50/500 mg in pm, you may have to increase th janumet to 50/1000 mg twice a day if sugars do not improve  Work on diet and exercise  Continue to test blood sugars 1-2 times a day  You can call the insurance to see if they cover the freestyle beau  Follow up in 3 months with blood work

## 2020-01-27 NOTE — PROGRESS NOTES
1/27/2020    Assessment/Plan      Diagnoses and all orders for this visit:    Type 2 diabetes mellitus with hyperglycemia, without long-term current use of insulin (HCC)  -     sitaGLIPtin-metFORMIN (JANUMET)  MG per tablet; Take 1 tablet by mouth daily after dinner  -     HEMOGLOBIN A1C W/ EAG ESTIMATION Lab Collect; Future  -     Comprehensive metabolic panel Lab Collect; Future  -     Lipid Panel with Direct LDL reflex Lab Collect; Future    Diabetic polyneuropathy associated with type 2 diabetes mellitus (HCC)  -     HEMOGLOBIN A1C W/ EAG ESTIMATION Lab Collect; Future  -     Comprehensive metabolic panel Lab Collect; Future  -     Lipid Panel with Direct LDL reflex Lab Collect; Future    Hashimoto's thyroiditis  -     HEMOGLOBIN A1C W/ EAG ESTIMATION Lab Collect; Future  -     Comprehensive metabolic panel Lab Collect; Future  -     Lipid Panel with Direct LDL reflex Lab Collect; Future    Essential hypertension  -     HEMOGLOBIN A1C W/ EAG ESTIMATION Lab Collect; Future  -     Comprehensive metabolic panel Lab Collect; Future  -     Lipid Panel with Direct LDL reflex Lab Collect; Future    Other orders  -     MISC NATURAL PRODUCTS PO; Take by mouth Ultra prostate vitamin  -     Discontinue: sitaGLIPtin-metFORMIN (JANUMET)  MG per tablet; Take 1 tablet by mouth daily after dinner        Assessment/Plan:  1  Type 2 diabetes  Most recent hemoglobin A1c is 8 4%  This remains too high in demonstrates uncontrolled type 2 diabetes  I have asked him to increase his Janumet 50/1000 mg twice a day but he refuses and will only take 50/1000 mg in the morning and 50/500 mg in the evening  He will continue to test his blood sugars 1 to 2 times a day  He was to try to work on diet exercise to bring his blood sugars down  He is interested in the freestyle Carlos Enrique Feeler and will check his insurance company to see if they cover this piece of equipment  2  Diabetic neuropathy  He denies neuropathic symptoms  Diabetic foot exam was performed in the office today  3  Hashimoto's thyroiditis  Most recent thyroid function tests are normal   He is biochemically and clinically euthyroid and currently needs no thyroid hormone replacement  4  Hypertension  Blood pressure is under good control on his current dose of amlodipine  I have asked him to follow up in 3 months with preceding hemoglobin A1c, CMP, and lipid panel  CC: Diabetes type 2, blood pressure, thyroid follow-up    History of Present Illness     HPI: Aracelis Cuevas is a 81y o  year old male with type 2 diabetes with neuropathy for 19 years, Hashimoto's thyroiditis, hypertension here for follow-up  He is on oral agents at home and takes Janumet 50/1000 mg twice a day, has been using janumet 50/500 mg in pm most times  He denies any polyuria, polydipsia, polyphagia  He has occasional nocturia  He is experiencing blurry vision and needs to have a cataract removed soon  He denies numbness or tingling of the feet  He denies chest pain or shortness of breath  He denies nephropathy, retinopathy, heart attack, stroke and claudication but does admit to neuropathy  Had a dizzy spell and to ER  In august 2019  MRI head negative  Had unusual gait  He did not follow up with neuro  Symptoms have mostly gone a way, still a bit lightheaded  Had EGD done October 2019  Had CT on stomach  Has liver spots  To go under evaluation  Also having urination issues with prostate  Hypoglycemic episodes: No never  The patient's last eye exam was in August 2019 with no retinopathy  The patient's last foot exam was in September 2018 at endocrine office visit  Last A1C was   Lab Results   Component Value Date    HGBA1C 8 4 (H) 01/21/2020     Blood Sugar/Glucometer/Pump/CGM review: checks blood sugars every am  No record brought with him today  Before breakfast: around 200 but recently higher and as high as over 300      He has hypertension and takes amlodipine 5 mg twice a day  He denies headache or stroke-like symptoms  He has Hashimoto's thyroiditis and has been euthyroid needing no thyroid hormone replacement  He is fatigued  Weight is 10 lb more than last visit  He is having a bit more depression over the  recently with some did family members that have occurred  He denies tremors, palpitation, diarrhea or constipation  Review of Systems   Constitutional: Positive for appetite change, fatigue and unexpected weight change  Not much appetite  Sometimes naps on the couch after eating  Weight up 10 lbs in 1-2 weeks  HENT: Negative for trouble swallowing  Eyes: Positive for visual disturbance  Having cataract surgery on left eye tomorrow  Has cataracts  Respiratory: Negative for chest tightness and shortness of breath  Cardiovascular: Negative for chest pain and palpitations  Gastrointestinal: Negative for abdominal pain, constipation, diarrhea and nausea  Endocrine: Negative for polydipsia, polyphagia and polyuria  Not drinking much water  Nocturia occasionally  Genitourinary: Positive for urgency  Skin: Negative for wound  Neurological: Positive for light-headedness  Negative for dizziness, tremors, weakness, numbness and headaches  Psychiatric/Behavioral: Positive for dysphoric mood  Negative for sleep disturbance  Having more depression with dead family members over the   Historical Information   Past Medical History:   Diagnosis Date    Hiatal hernia      No past surgical history on file    Social History   Social History     Substance and Sexual Activity   Alcohol Use Yes    Comment: social     Social History     Substance and Sexual Activity   Drug Use No     Social History     Tobacco Use   Smoking Status Former Smoker    Packs/day: 0 10    Types: Cigarettes    Last attempt to quit: 2018    Years since quittin 0   Smokeless Tobacco Never Used     Family History:   Family History   Problem Relation Age of Onset    Hypertension Mother     Heart disease Father         lung collapsed, several heart attacks    Diabetes type II Sister     Hypertension Sister     Diabetes type II Brother     Heart disease Brother     Hypertension Brother     Diabetes type II Sister     Hypertension Sister     Estelline's disease Sister     Hypertension Sister     Hypertension Sister     Hypertension Sister        Meds/Allergies   Current Outpatient Medications   Medication Sig Dispense Refill    amLODIPine (NORVASC) 5 mg tablet Take 5 mg by mouth 2 (two) times a day       glucose blood (ONE TOUCH ULTRA TEST) test strip CHECK SUGARS ONE TIME A  each 1    MISC NATURAL PRODUCTS PO Take by mouth Ultra prostate vitamin      Omega-3 Fatty Acids (FISH OIL) 1,000 mg Take 1,000 mg by mouth daily      sitaGLIPtin-metFORMIN (JANUMET)  MG per tablet Take 1 tablet by mouth 2 (two) times a day with meals (Patient taking differently: Take 1 tablet by mouth daily after breakfast ) 180 tablet 3    sitaGLIPtin-metFORMIN (JANUMET)  MG per tablet Take 1 tablet by mouth daily after dinner 90 tablet 3     No current facility-administered medications for this visit  Allergies   Allergen Reactions    Actos [Pioglitazone] GI Intolerance     Significant GI upset       Objective   Vitals: Blood pressure 126/80, pulse 72, height 6' (1 829 m), weight 105 kg (232 lb)  Invasive Devices     None                 Physical Exam   Constitutional: He is oriented to person, place, and time  He appears well-developed and well-nourished  HENT:   Head: Normocephalic and atraumatic  Eyes: Pupils are equal, round, and reactive to light  Conjunctivae and EOM are normal    No lid lag, stare, proptosis, or periorbital edema  Neck: Normal range of motion  Neck supple  No thyromegaly present  Thyroid normal in size without palpable nodules  No carotid bruits     Cardiovascular: Normal rate, regular rhythm, normal heart sounds and intact distal pulses  Pulses are no weak pulses  No murmur heard  Pulses:       Dorsalis pedis pulses are 2+ on the right side, and 2+ on the left side  Posterior tibial pulses are 2+ on the right side, and 2+ on the left side  Pulmonary/Chest: Effort normal and breath sounds normal  He has no wheezes  Abdominal: Soft  There is no tenderness  Musculoskeletal: Normal range of motion  He exhibits deformity  He exhibits no edema  thick skin and callus on the plantar surface of the heels and 1st through 5th MP joints  No ulcerations of the feet  No tremor of the outstretched hands  Feet:   Right Foot:   Skin Integrity: Positive for callus  Negative for ulcer, skin breakdown, erythema, warmth or dry skin  Left Foot:   Skin Integrity: Positive for callus  Negative for ulcer, skin breakdown, erythema, warmth or dry skin  Lymphadenopathy:     He has no cervical adenopathy  Neurological: He is alert and oriented to person, place, and time  He has normal reflexes  Vibration sensation diminished to the 1st toe DIP joint bilaterally  Microfilament sensation intact except diminished over the calluses of the heels, plantar surface of the 1st and 5th MP joints bilaterally  Skin: Skin is warm and dry  No rash noted  No erythema  Vitals reviewed  Patient's shoes and socks removed  Right Foot/Ankle   Right Foot Inspection  Skin Exam: skin normal, skin intact, callus and callus no dry skin, no warmth, no erythema, no maceration, no abnormal color, no pre-ulcer and no ulcer                          Toe Exam: ROM and strength within normal limits  Sensory   Vibration: diminished    Monofilament testing: diminished  Vascular  Capillary refills: < 3 seconds  The right DP pulse is 2+  The right PT pulse is 2+       Left Foot/Ankle  Left Foot Inspection  Skin Exam: skin normal, skin intact and callusno dry skin, no warmth, no erythema, no maceration, normal color, no pre-ulcer and no ulcer                         Toe Exam: ROM and strength within normal limits                   Sensory   Vibration: diminished    Monofilament: diminished  Vascular  Capillary refills: < 3 seconds  The left DP pulse is 2+  The left PT pulse is 2+  Assign Risk Category:  Deformity present; Loss of protective sensation; No weak pulses       Risk: 1        The history was obtained from the review of the chart and from the patient  Lab Results:    Most recent Alc is  Lab Results   Component Value Date    HGBA1C 8 4 (H) 01/21/2020           Blood work done on 01/21/2020 showed a glucose of 166 random but was otherwise normal     Lab Results   Component Value Date    CREATININE 0 81 01/21/2020    CREATININE 0 84 07/09/2019    CREATININE 0 86 03/30/2019    BUN 12 01/21/2020    K 4 4 01/21/2020     01/21/2020    CO2 25 01/21/2020       Lab Results   Component Value Date    HDL 39 (L) 03/30/2019    TRIG 110 03/30/2019       Lab Results   Component Value Date    ALT 18 01/21/2020    AST 14 01/21/2020    ALKPHOS 56 01/21/2020       Lab Results   Component Value Date    TSH 3 46 07/09/2019    FREET4 1 1 07/09/2019     Urine microalbumin to creatinine ratio was 4       Future Appointments   Date Time Provider Lloyd Gee   5/7/2020  3:20 PM Lyla Hobbs MD [facility]

## 2020-04-28 DIAGNOSIS — E11.65 TYPE 2 DIABETES MELLITUS WITH HYPERGLYCEMIA, WITHOUT LONG-TERM CURRENT USE OF INSULIN (HCC): ICD-10-CM

## 2020-04-28 DIAGNOSIS — E11.8 TYPE 2 DIABETES MELLITUS WITH COMPLICATION (HCC): ICD-10-CM

## 2020-05-01 DIAGNOSIS — E11.8 TYPE 2 DIABETES MELLITUS WITH COMPLICATION (HCC): Primary | ICD-10-CM

## 2020-05-13 DIAGNOSIS — E11.8 TYPE 2 DIABETES MELLITUS WITH COMPLICATION (HCC): ICD-10-CM

## 2020-07-16 ENCOUNTER — TELEPHONE (OUTPATIENT)
Dept: ENDOCRINOLOGY | Facility: HOSPITAL | Age: 70
End: 2020-07-16

## 2020-07-16 ENCOUNTER — OFFICE VISIT (OUTPATIENT)
Dept: ENDOCRINOLOGY | Facility: HOSPITAL | Age: 70
End: 2020-07-16
Payer: MEDICARE

## 2020-07-16 VITALS
BODY MASS INDEX: 30.04 KG/M2 | TEMPERATURE: 97.8 F | SYSTOLIC BLOOD PRESSURE: 132 MMHG | HEIGHT: 72 IN | HEART RATE: 68 BPM | WEIGHT: 221.8 LBS | DIASTOLIC BLOOD PRESSURE: 70 MMHG

## 2020-07-16 DIAGNOSIS — I10 ESSENTIAL HYPERTENSION: ICD-10-CM

## 2020-07-16 DIAGNOSIS — E11.65 TYPE 2 DIABETES MELLITUS WITH HYPERGLYCEMIA, WITHOUT LONG-TERM CURRENT USE OF INSULIN (HCC): Primary | ICD-10-CM

## 2020-07-16 DIAGNOSIS — E06.3 HASHIMOTO'S THYROIDITIS: ICD-10-CM

## 2020-07-16 DIAGNOSIS — E11.42 DIABETIC POLYNEUROPATHY ASSOCIATED WITH TYPE 2 DIABETES MELLITUS (HCC): ICD-10-CM

## 2020-07-16 PROCEDURE — 99215 OFFICE O/P EST HI 40 MIN: CPT | Performed by: INTERNAL MEDICINE

## 2020-07-16 NOTE — PATIENT INSTRUCTIONS
I have no recent blood work  Let's get blood work done now  Continue the same janumet for now  Make sure to not skip the janumet for lower carb meals  Continue to test blood sugars once daily  Vary the times and keep a record  Follow up in 3 months with blood work

## 2020-07-16 NOTE — PROGRESS NOTES
7/16/2020    Assessment/Plan      Diagnoses and all orders for this visit:    Type 2 diabetes mellitus with hyperglycemia, without long-term current use of insulin (HCC)  -     HEMOGLOBIN A1C W/ EAG ESTIMATION Lab Collect  -     Comprehensive metabolic panel Lab Collect  -     Lipid Panel with Direct LDL reflex Lab Collect  -     HEMOGLOBIN A1C W/ EAG ESTIMATION Lab Collect; Future  -     Comprehensive metabolic panel Lab Collect; Future  -     CBC and differential Lab Collect; Future  -     TSH, 3rd generation Lab Collect; Future  -     T4, free Lab Collect; Future    Diabetic polyneuropathy associated with type 2 diabetes mellitus (HCC)  -     HEMOGLOBIN A1C W/ EAG ESTIMATION Lab Collect  -     Comprehensive metabolic panel Lab Collect  -     Lipid Panel with Direct LDL reflex Lab Collect  -     HEMOGLOBIN A1C W/ EAG ESTIMATION Lab Collect; Future  -     Comprehensive metabolic panel Lab Collect; Future  -     CBC and differential Lab Collect; Future  -     TSH, 3rd generation Lab Collect; Future  -     T4, free Lab Collect; Future    Hashimoto's thyroiditis  -     HEMOGLOBIN A1C W/ EAG ESTIMATION Lab Collect  -     Comprehensive metabolic panel Lab Collect  -     Lipid Panel with Direct LDL reflex Lab Collect  -     HEMOGLOBIN A1C W/ EAG ESTIMATION Lab Collect; Future  -     Comprehensive metabolic panel Lab Collect; Future  -     CBC and differential Lab Collect; Future  -     TSH, 3rd generation Lab Collect; Future  -     T4, free Lab Collect; Future    Essential hypertension  -     HEMOGLOBIN A1C W/ EAG ESTIMATION Lab Collect  -     Comprehensive metabolic panel Lab Collect  -     Lipid Panel with Direct LDL reflex Lab Collect  -     HEMOGLOBIN A1C W/ EAG ESTIMATION Lab Collect; Future  -     Comprehensive metabolic panel Lab Collect; Future  -     CBC and differential Lab Collect; Future  -     TSH, 3rd generation Lab Collect; Future  -     T4, free Lab Collect;  Future    Other orders  - multivitamin-minerals (CENTRUM) tablet; Take 1 tablet by mouth daily        Assessment/Plan:  1  Type 2 diabetes  I have no recent blood work  He did not yet get his blood work done  I have asked him to get his blood work done now and for now he will continue the same Janumet 50/1000 mg in the morning and 50/500 mg at supper  I have asked him not to skip his Janumet dosage if he has a lower carbohydrate meal   He should continue to test his blood sugars once a day but vary the times throughout the day  2  Diabetic neuropathy  He has stable neuropathic symptoms and diabetic foot exams are up-to-date  3  Hashimoto's thyroiditis  He is clinically euthyroid  I will repeat thyroid function tests with his next visit  4  Hypertension  Blood pressure is under good control on his amlodipine  I have asked him to get a hemoglobin A1c, CMP, lipid panel done now  I have asked him to follow up in 3 months with preceding hemoglobin A1c, CMP, CBC, TSH, and free T4       CC: Diabetes type 2, thyroid, blood pressure follow-up    History of Present Illness     HPI: Devaughn Will is a 78y o  year old male with type 2 diabetes with neuropathy for 20 years, Hashimoto's thyroiditis, hypertension for follow-up visit  He is on oral agents at home and takes Janumet 50/1000 mg in the morning and 50/500 mg at supper  Skips about once every 10 days with decrease in carb meal in pm  He denies any polyphagia, polydipsia, nocturia and blurry vision  He has some polyuria  He has occasional numbness and tingling of the feet especially if he lays in the wrong position  He denies chest pain or shortness of breath  He denies nephropathy, retinopathy, heart attack, stroke and claudication but does admit to neuropathy  Hypoglycemic episodes: No never  The patient's last eye exam was in August 2019 with no retinopathy  The patient's last foot exam was in January 2020 at endocrine office visit   Last A1C was   Lab Results Component Value Date    HGBA1C 8 4 (H) 01/21/2020     Blood Sugar/Glucometer/Pump/CGM review: Checks blood sugars once a day in am   Before breakfast: 150-250  Before lunch:   Before dinner:   Bedtime:     He has Hashimoto's thyroiditis and has been euthyroid  He is currently on no thyroid medication  He denies heat or cold intolerance, diarrhea or constipation, palpitation, or tremors  Weight is 11 lb less than January 2020 and he admits he does not have much appetite but is walking at least an hour a day  He denies fatigue  He has a lot of sleeping issues  He has hypertension and takes amlodipine 5 mg twice a day  He denies headache or stroke-like symptoms  Review of Systems   Constitutional: Positive for appetite change and unexpected weight change  Negative for fatigue  Weight 11 lb less than January 2020  No appetite  Walks 1 hour a day  HENT: Negative for trouble swallowing  Eyes: Negative for visual disturbance  No diplopia  Has a cataract still to be done on right  Respiratory: Negative for chest tightness and shortness of breath  Cardiovascular: Negative for chest pain and palpitations  Gastrointestinal: Positive for abdominal pain  Negative for constipation, diarrhea and nausea  Occasional mid abdominal pains  A bit slow digestion on a test    Endocrine: Positive for polyuria  Negative for cold intolerance, heat intolerance, polydipsia and polyphagia  Nocturia none  Some polyuria with prostate issues  Skin: Negative for rash and wound  Neurological: Positive for numbness  Negative for dizziness, weakness, light-headedness and headaches  Occasional numbness and tingling of the feet depending on how laying down  Psychiatric/Behavioral: Positive for sleep disturbance  Wakes and can't get back to sleep, will sleep hours on couch and then in bed         Historical Information   Past Medical History:   Diagnosis Date    Hiatal hernia No past surgical history on file  Social History   Social History     Substance and Sexual Activity   Alcohol Use Yes    Frequency: Monthly or less    Comment: social     Social History     Substance and Sexual Activity   Drug Use No     Social History     Tobacco Use   Smoking Status Former Smoker    Packs/day: 0 10    Types: Cigarettes    Last attempt to quit: 2018    Years since quittin 5   Smokeless Tobacco Never Used     Family History:   Family History   Problem Relation Age of Onset    Hypertension Mother     Heart disease Father         lung collapsed, several heart attacks    Diabetes type II Sister     Hypertension Sister     Diabetes type II Brother     Heart disease Brother     Hypertension Brother     Diabetes type II Sister     Hypertension Sister     [place]'s disease Sister     Hypertension Sister     Hypertension Sister     Hypertension Sister        Meds/Allergies   Current Outpatient Medications   Medication Sig Dispense Refill    amLODIPine (NORVASC) 5 mg tablet Take 5 mg by mouth 2 (two) times a day       glucose blood test strip CHECK SUGARS ONE TIME A  each 1    multivitamin-minerals (CENTRUM) tablet Take 1 tablet by mouth daily      Omega-3 Fatty Acids (FISH OIL) 1,000 mg Take 1,000 mg by mouth daily      sitaGLIPtin-metFORMIN (JANUMET)  MG per tablet Take 1 tablet by mouth daily after breakfast (Patient taking differently: Take 1 tablet by mouth daily after breakfast And he takes 50/500 mg tablet at supper) 14 tablet 0    sitaGLIPtin-metFORMIN (JANUMET)  MG per tablet Take 1 tablet by mouth daily after dinner (Patient taking differently: Take 1 tablet by mouth daily after dinner And he takes  mg tablet at breakfast) 90 tablet 3     No current facility-administered medications for this visit        Allergies   Allergen Reactions    Actos [Pioglitazone] GI Intolerance     Significant GI upset       Objective   Vitals: Blood pressure 132/70, pulse 68, temperature 97 8 °F (36 6 °C), height 6' (1 829 m), weight 101 kg (221 lb 12 8 oz)  Invasive Devices     None                 Physical Exam   Constitutional: He is oriented to person, place, and time  He appears well-developed and well-nourished  HENT:   Head: Normocephalic and atraumatic  Eyes: Conjunctivae and EOM are normal    Neck: Normal range of motion  Neck supple  No thyromegaly present  Thyroid normal in size  No carotid bruits  Cardiovascular: Normal rate, regular rhythm, normal heart sounds and intact distal pulses  No murmur heard  Pulmonary/Chest: Effort normal and breath sounds normal  He has no wheezes  Abdominal: Soft  There is no tenderness  Musculoskeletal: Normal range of motion  He exhibits no edema or deformity  No ulcerations of the feet  Lymphadenopathy:     He has no cervical adenopathy  Neurological: He is alert and oriented to person, place, and time  He has normal reflexes  Skin: Skin is warm and dry  No rash noted  No erythema  Vitals reviewed  The history was obtained from the review of the chart and from the patient and wife  Lab Results:     I have no recent blood work as he did not yet get his blood work done for this office visit  Most recent Alc is  Lab Results   Component Value Date    HGBA1C 8 4 (H) 01/21/2020             Lab Results   Component Value Date    CREATININE 0 81 01/21/2020    CREATININE 0 84 07/09/2019    CREATININE 0 86 03/30/2019    BUN 12 01/21/2020    K 4 4 01/21/2020     01/21/2020    CO2 25 01/21/2020     Lab Results   Component Value Date    HDL 39 (L) 03/30/2019    TRIG 110 03/30/2019     Lab Results   Component Value Date    ALT 18 01/21/2020    AST 14 01/21/2020    ALKPHOS 56 01/21/2020     Lab Results   Component Value Date    TSH 3 46 07/09/2019    FREET4 1 1 07/09/2019             No future appointments

## 2020-07-21 LAB
ALBUMIN SERPL-MCNC: 4.3 G/DL (ref 3.6–5.1)
ALBUMIN/GLOB SERPL: 1.7 (CALC) (ref 1–2.5)
ALP SERPL-CCNC: 59 U/L (ref 35–144)
ALT SERPL-CCNC: 18 U/L (ref 9–46)
AST SERPL-CCNC: 15 U/L (ref 10–35)
BILIRUB SERPL-MCNC: 0.4 MG/DL (ref 0.2–1.2)
BUN SERPL-MCNC: 13 MG/DL (ref 7–25)
BUN/CREAT SERPL: ABNORMAL (CALC) (ref 6–22)
CALCIUM SERPL-MCNC: 9.4 MG/DL (ref 8.6–10.3)
CHLORIDE SERPL-SCNC: 106 MMOL/L (ref 98–110)
CHOLEST SERPL-MCNC: 152 MG/DL
CHOLEST/HDLC SERPL: 3.4 (CALC)
CO2 SERPL-SCNC: 24 MMOL/L (ref 20–32)
CREAT SERPL-MCNC: 0.76 MG/DL (ref 0.7–1.25)
EST. AVERAGE GLUCOSE BLD GHB EST-MCNC: 166 (CALC)
EST. AVERAGE GLUCOSE BLD GHB EST-SCNC: 9.2 (CALC)
GLOBULIN SER CALC-MCNC: 2.5 G/DL (CALC) (ref 1.9–3.7)
GLUCOSE SERPL-MCNC: 164 MG/DL (ref 65–99)
HBA1C MFR BLD: 7.4 % OF TOTAL HGB
HDLC SERPL-MCNC: 45 MG/DL
LDLC SERPL CALC-MCNC: 89 MG/DL (CALC)
NONHDLC SERPL-MCNC: 107 MG/DL (CALC)
POTASSIUM SERPL-SCNC: 4.4 MMOL/L (ref 3.5–5.3)
PROT SERPL-MCNC: 6.8 G/DL (ref 6.1–8.1)
SL AMB EGFR AFRICAN AMERICAN: 109 ML/MIN/1.73M2
SL AMB EGFR NON AFRICAN AMERICAN: 94 ML/MIN/1.73M2
SODIUM SERPL-SCNC: 139 MMOL/L (ref 135–146)
TRIGL SERPL-MCNC: 90 MG/DL

## 2020-08-19 LAB
LEFT EYE DIABETIC RETINOPATHY: NORMAL
RIGHT EYE DIABETIC RETINOPATHY: NORMAL

## 2020-08-28 DIAGNOSIS — E11.8 TYPE 2 DIABETES MELLITUS WITH COMPLICATION (HCC): ICD-10-CM

## 2020-11-02 LAB — HBA1C MFR BLD HPLC: 7.4 %

## 2020-11-03 LAB
BASOPHILS # BLD AUTO: 114 CELLS/UL (ref 0–200)
BASOPHILS NFR BLD AUTO: 1.2 %
EOSINOPHIL # BLD AUTO: 190 CELLS/UL (ref 15–500)
EOSINOPHIL NFR BLD AUTO: 2 %
ERYTHROCYTE [DISTWIDTH] IN BLOOD BY AUTOMATED COUNT: 12.8 % (ref 11–15)
HCT VFR BLD AUTO: 45.4 % (ref 38.5–50)
HGB BLD-MCNC: 15 G/DL (ref 13.2–17.1)
LYMPHOCYTES # BLD AUTO: 1786 CELLS/UL (ref 850–3900)
LYMPHOCYTES NFR BLD AUTO: 18.8 %
MCH RBC QN AUTO: 28.7 PG (ref 27–33)
MCHC RBC AUTO-ENTMCNC: 33 G/DL (ref 32–36)
MCV RBC AUTO: 87 FL (ref 80–100)
MONOCYTES # BLD AUTO: 618 CELLS/UL (ref 200–950)
MONOCYTES NFR BLD AUTO: 6.5 %
NEUTROPHILS # BLD AUTO: 6793 CELLS/UL (ref 1500–7800)
NEUTROPHILS NFR BLD AUTO: 71.5 %
PLATELET # BLD AUTO: 267 THOUSAND/UL (ref 140–400)
PMV BLD REES-ECKER: 10.3 FL (ref 7.5–12.5)
RBC # BLD AUTO: 5.22 MILLION/UL (ref 4.2–5.8)
WBC # BLD AUTO: 9.5 THOUSAND/UL (ref 3.8–10.8)

## 2020-11-06 DIAGNOSIS — E11.8 TYPE 2 DIABETES MELLITUS WITH COMPLICATION (HCC): ICD-10-CM

## 2020-11-06 RX ORDER — BLOOD SUGAR DIAGNOSTIC
STRIP MISCELLANEOUS
Qty: 100 EACH | Refills: 1 | Status: SHIPPED | OUTPATIENT
Start: 2020-11-06 | End: 2021-06-27

## 2020-11-09 ENCOUNTER — OFFICE VISIT (OUTPATIENT)
Dept: ENDOCRINOLOGY | Facility: HOSPITAL | Age: 70
End: 2020-11-09
Payer: MEDICARE

## 2020-11-09 VITALS
SYSTOLIC BLOOD PRESSURE: 130 MMHG | HEART RATE: 80 BPM | DIASTOLIC BLOOD PRESSURE: 70 MMHG | WEIGHT: 223.8 LBS | BODY MASS INDEX: 30.31 KG/M2 | HEIGHT: 72 IN | TEMPERATURE: 98.4 F

## 2020-11-09 DIAGNOSIS — R79.89 ELEVATED TSH: ICD-10-CM

## 2020-11-09 DIAGNOSIS — E11.65 TYPE 2 DIABETES MELLITUS WITH HYPERGLYCEMIA, WITHOUT LONG-TERM CURRENT USE OF INSULIN (HCC): Primary | ICD-10-CM

## 2020-11-09 DIAGNOSIS — E11.42 DIABETIC POLYNEUROPATHY ASSOCIATED WITH TYPE 2 DIABETES MELLITUS (HCC): ICD-10-CM

## 2020-11-09 DIAGNOSIS — E06.3 HASHIMOTO'S THYROIDITIS: ICD-10-CM

## 2020-11-09 DIAGNOSIS — I10 ESSENTIAL HYPERTENSION: ICD-10-CM

## 2020-11-09 PROCEDURE — 99215 OFFICE O/P EST HI 40 MIN: CPT | Performed by: INTERNAL MEDICINE

## 2021-02-16 LAB
ALBUMIN SERPL-MCNC: 4.4 G/DL (ref 3.6–5.1)
ALBUMIN/CREAT UR: 2 MCG/MG CREAT
ALBUMIN/GLOB SERPL: 1.8 (CALC) (ref 1–2.5)
ALP SERPL-CCNC: 52 U/L (ref 35–144)
ALT SERPL-CCNC: 21 U/L (ref 9–46)
AST SERPL-CCNC: 17 U/L (ref 10–35)
BILIRUB SERPL-MCNC: 0.5 MG/DL (ref 0.2–1.2)
BUN SERPL-MCNC: 13 MG/DL (ref 7–25)
BUN/CREAT SERPL: ABNORMAL (CALC) (ref 6–22)
CALCIUM SERPL-MCNC: 9.4 MG/DL (ref 8.6–10.3)
CHLORIDE SERPL-SCNC: 104 MMOL/L (ref 98–110)
CO2 SERPL-SCNC: 26 MMOL/L (ref 20–32)
CREAT SERPL-MCNC: 0.8 MG/DL (ref 0.7–1.25)
CREAT UR-MCNC: 82 MG/DL (ref 20–320)
EST. AVERAGE GLUCOSE BLD GHB EST-MCNC: 166 (CALC)
EST. AVERAGE GLUCOSE BLD GHB EST-SCNC: 9.2 (CALC)
GLOBULIN SER CALC-MCNC: 2.5 G/DL (CALC) (ref 1.9–3.7)
GLUCOSE SERPL-MCNC: 130 MG/DL (ref 65–99)
HBA1C MFR BLD: 7.4 % OF TOTAL HGB
MICROALBUMIN UR-MCNC: 0.2 MG/DL
POTASSIUM SERPL-SCNC: 4.3 MMOL/L (ref 3.5–5.3)
PROT SERPL-MCNC: 6.9 G/DL (ref 6.1–8.1)
SL AMB EGFR AFRICAN AMERICAN: 106 ML/MIN/1.73M2
SL AMB EGFR NON AFRICAN AMERICAN: 92 ML/MIN/1.73M2
SODIUM SERPL-SCNC: 138 MMOL/L (ref 135–146)
T4 FREE SERPL-MCNC: 1.1 NG/DL (ref 0.8–1.8)
TSH SERPL-ACNC: 4.64 MIU/L (ref 0.4–4.5)

## 2021-02-16 PROCEDURE — 3051F HG A1C>EQUAL 7.0%<8.0%: CPT | Performed by: INTERNAL MEDICINE

## 2021-02-16 PROCEDURE — 3061F NEG MICROALBUMINURIA REV: CPT | Performed by: INTERNAL MEDICINE

## 2021-02-17 ENCOUNTER — OFFICE VISIT (OUTPATIENT)
Dept: ENDOCRINOLOGY | Facility: HOSPITAL | Age: 71
End: 2021-02-17
Payer: COMMERCIAL

## 2021-02-17 VITALS
HEIGHT: 72 IN | HEART RATE: 73 BPM | WEIGHT: 228.4 LBS | DIASTOLIC BLOOD PRESSURE: 70 MMHG | SYSTOLIC BLOOD PRESSURE: 132 MMHG | BODY MASS INDEX: 30.94 KG/M2

## 2021-02-17 DIAGNOSIS — I10 ESSENTIAL HYPERTENSION: ICD-10-CM

## 2021-02-17 DIAGNOSIS — E11.42 DIABETIC POLYNEUROPATHY ASSOCIATED WITH TYPE 2 DIABETES MELLITUS (HCC): ICD-10-CM

## 2021-02-17 DIAGNOSIS — R79.89 ELEVATED TSH: ICD-10-CM

## 2021-02-17 DIAGNOSIS — E11.65 TYPE 2 DIABETES MELLITUS WITH HYPERGLYCEMIA, WITHOUT LONG-TERM CURRENT USE OF INSULIN (HCC): Primary | ICD-10-CM

## 2021-02-17 DIAGNOSIS — E06.3 HASHIMOTO'S THYROIDITIS: ICD-10-CM

## 2021-02-17 PROCEDURE — 3078F DIAST BP <80 MM HG: CPT | Performed by: INTERNAL MEDICINE

## 2021-02-17 PROCEDURE — 99215 OFFICE O/P EST HI 40 MIN: CPT | Performed by: INTERNAL MEDICINE

## 2021-02-17 PROCEDURE — 1036F TOBACCO NON-USER: CPT | Performed by: INTERNAL MEDICINE

## 2021-02-17 PROCEDURE — 3008F BODY MASS INDEX DOCD: CPT | Performed by: INTERNAL MEDICINE

## 2021-02-17 PROCEDURE — 1160F RVW MEDS BY RX/DR IN RCRD: CPT | Performed by: INTERNAL MEDICINE

## 2021-02-17 PROCEDURE — 3075F SYST BP GE 130 - 139MM HG: CPT | Performed by: INTERNAL MEDICINE

## 2021-02-17 NOTE — PROGRESS NOTES
2/19/2021    Assessment/Plan      Diagnoses and all orders for this visit:    Type 2 diabetes mellitus with hyperglycemia, without long-term current use of insulin ([facility] [address] )  -     HEMOGLOBIN A1C W/ EAG ESTIMATION Lab Collect; Future  -     Comprehensive metabolic panel Lab Collect; Future  -     CBC and differential Lab Collect; Future  -     TSH, 3rd generation Lab Collect; Future  -     T4, free Lab Collect; Future  -     Microalbumin / creatinine urine ratio Lab Collect; Future  -     Lipid Panel with Direct LDL reflex Lab Collect; Future    Diabetic polyneuropathy associated with type 2 diabetes mellitus (HCC)  -     HEMOGLOBIN A1C W/ EAG ESTIMATION Lab Collect; Future  -     Comprehensive metabolic panel Lab Collect; Future  -     CBC and differential Lab Collect; Future  -     TSH, 3rd generation Lab Collect; Future  -     T4, free Lab Collect; Future  -     Microalbumin / creatinine urine ratio Lab Collect; Future  -     Lipid Panel with Direct LDL reflex Lab Collect; Future    Essential hypertension  -     HEMOGLOBIN A1C W/ EAG ESTIMATION Lab Collect; Future  -     Comprehensive metabolic panel Lab Collect; Future  -     CBC and differential Lab Collect; Future  -     TSH, 3rd generation Lab Collect; Future  -     T4, free Lab Collect; Future  -     Microalbumin / creatinine urine ratio Lab Collect; Future  -     Lipid Panel with Direct LDL reflex Lab Collect; Future    Hashimoto's thyroiditis  -     HEMOGLOBIN A1C W/ EAG ESTIMATION Lab Collect; Future  -     Comprehensive metabolic panel Lab Collect; Future  -     CBC and differential Lab Collect; Future  -     TSH, 3rd generation Lab Collect; Future  -     T4, free Lab Collect; Future  -     Microalbumin / creatinine urine ratio Lab Collect; Future  -     Lipid Panel with Direct LDL reflex Lab Collect; Future    Elevated TSH  -     HEMOGLOBIN A1C W/ EAG ESTIMATION Lab Collect; Future  -     Comprehensive metabolic panel Lab Collect;  Future  -     CBC and differential Lab Collect; Future  -     TSH, 3rd generation Lab Collect; Future  -     T4, free Lab Collect; Future  -     Microalbumin / creatinine urine ratio Lab Collect; Future  -     Lipid Panel with Direct LDL reflex Lab Collect; Future    Other orders  -     Zinc Sulfate (ZINC 15 PO); Take by mouth        Assessment/Plan:   1  Type 2 diabetes  Hemoglobin A1c is 7 4% which is stable  I would like to see it a little bit lower so he needs to get to work on watching his diet and exercising regularly which she has not been doing as much of  For now, he will continue the same Janumet and continue to test his blood sugars once or twice a day  2  Diabetic neuropathy  He has no neuropathic symptoms  Diabetic foot exam was performed in the office today  3  Hashimoto's thyroiditis with a mildly elevated TSH  His TSH is just slightly above normal   It is been stable and he is not interested in starting a new medicine at this point  I will continue to follow it over time  4  Hypertension  Blood pressure is under good control on his current dose of amlodipine  I have asked him to follow up in 6 months with preceding hemoglobin A1c, CMP, CBC, lipid panel, TSH, free T4, and urine microalbumin to creatinine ratio  CC: Diabetes Type 2, blood pressure, thyroid follow-up    History of Present Illness     HPI: Lorena Antonio is a 78y o  year old male with type 2 diabetes with neuropathy for 20 years, Hashimoto's, hypertension for follow-up visit  He is on oral agents at home and takes Janumet 50/1000 mg at breakfast and janumet 50/500 mg at supper  He denies any polyuria, polydipsia, polyphagia, and blurry vision  He has once a night nocturia  He denies numbness or tingling of the feet  He denies chest pain or shortness of breath  He denies nephropathy, retinopathy, heart attack, stroke and claudication but does admit to neuropathy  Hypoglycemic episodes: No never      The patient's last eye exam was in  August 2020 with no retinopathy  The patient's last foot exam was in  January 2020 at endocrine office visit  He does not see a podiatrist  Last A1C was   Lab Results   Component Value Date    HGBA1C 7 4 (H) 02/15/2021     Blood Sugar/Glucometer/Pump/CGM review: checks blood sugars at least once daily  150-250, very variable  He has hypertension and takes amlodipine 5 mg twice a day  He denies headache or stroke-like symptoms  He has a history of elevated TSH but is currently on no medications with an underlying Hashimoto's thyroiditis  Weight is 5 lb more than last visit  He denies heat or cold intolerance, insomnia, fatigue, palpitation, tremors, diarrhea or constipation  Review of Systems   Constitutional: Negative for fatigue and unexpected weight change  Weight 5 lb more than last visit  tries to walk 1 hour a day  HENT: Negative for trouble swallowing  Eyes: Negative for visual disturbance  To have right cataract removed eventually  Respiratory: Negative for chest tightness and shortness of breath  Cardiovascular: Negative for chest pain and palpitations  Gastrointestinal: Positive for abdominal distention  Negative for abdominal pain, constipation, diarrhea and nausea  Stomach always bloated  Endocrine: Negative for cold intolerance, heat intolerance, polydipsia, polyphagia and polyuria  Nocturia once a night  Skin: Negative for rash and wound  Neurological: Negative for dizziness, tremors, weakness, light-headedness, numbness and headaches  Occasional numbness and tingling of the feet or in leg when in certain positions  Psychiatric/Behavioral: Negative for sleep disturbance  Historical Information   Past Medical History:   Diagnosis Date    Hiatal hernia      No past surgical history on file    Social History   Social History     Substance and Sexual Activity   Alcohol Use Yes    Frequency: Monthly or less Comment: social     Social History     Substance and Sexual Activity   Drug Use No     Social History     Tobacco Use   Smoking Status Never Smoker   Smokeless Tobacco Never Used     Family History:   Family History   Problem Relation Age of Onset    Hypertension Mother     Heart disease Father         lung collapsed, several heart attacks    Diabetes type II Sister     Hypertension Sister     Diabetes type II Brother     Heart disease Brother     Hypertension Brother     Diabetes type II Sister     Hypertension Sister     Lucio's disease Sister     Hypertension Sister     Hypertension Sister     Hypertension Sister        Meds/Allergies   Current Outpatient Medications   Medication Sig Dispense Refill    amLODIPine (NORVASC) 5 mg tablet Take 5 mg by mouth 2 (two) times a day       multivitamin-minerals (CENTRUM) tablet Take 1 tablet by mouth daily      Omega-3 Fatty Acids (FISH OIL) 1,000 mg Take 1,000 mg by mouth daily      OneTouch Ultra test strip USE TO CHECK BLOOD SUGAR ONCE DAILY 100 each 1    sitaGLIPtin-metFORMIN (JANUMET)  MG per tablet Take 1 tablet by mouth daily after breakfast 90 tablet 2    sitaGLIPtin-metFORMIN (JANUMET)  MG per tablet Take 1 tablet by mouth daily after dinner (Patient taking differently: Take 1 tablet by mouth daily after dinner And he takes 50/1000 mg tablet at breakfast) 90 tablet 3    Zinc Sulfate (ZINC 15 PO) Take by mouth       No current facility-administered medications for this visit  Allergies   Allergen Reactions    Actos [Pioglitazone] GI Intolerance     Significant GI upset       Objective   Vitals: Blood pressure 132/70, pulse 73, height 6' (1 829 m), weight 104 kg (228 lb 6 4 oz)  Invasive Devices     None                 Physical Exam  Vitals signs reviewed  Constitutional:       Appearance: Normal appearance  He is well-developed  HENT:      Head: Normocephalic and atraumatic     Eyes:      Extraocular Movements: Extraocular movements intact  Conjunctiva/sclera: Conjunctivae normal       Comments: No lid lag, stare, proptosis, or periorbital edema  Neck:      Musculoskeletal: Normal range of motion and neck supple  Thyroid: No thyromegaly  Vascular: No carotid bruit  Comments: Thyroid normal in size  No palpable thyroid nodules  Cardiovascular:      Rate and Rhythm: Normal rate and regular rhythm  Pulses: Normal pulses  no weak pulses          Dorsalis pedis pulses are 2+ on the right side and 2+ on the left side  Posterior tibial pulses are 2+ on the right side and 2+ on the left side  Heart sounds: Normal heart sounds  No murmur  Pulmonary:      Effort: Pulmonary effort is normal       Breath sounds: Normal breath sounds  No wheezing  Abdominal:      Palpations: Abdomen is soft  Musculoskeletal: Normal range of motion  General: No deformity  Right lower leg: No edema  Left lower leg: No edema  Comments: Thick skin of the heels  No ulcerations of the feet  No tremor of the outstretched hands  Feet:      Right foot:      Skin integrity: Callus present  No ulcer, skin breakdown, erythema, warmth or dry skin  Left foot:      Skin integrity: Callus present  No ulcer, skin breakdown, erythema, warmth or dry skin  Lymphadenopathy:      Cervical: No cervical adenopathy  Skin:     General: Skin is warm and dry  Findings: No erythema or rash  Neurological:      Mental Status: He is alert and oriented to person, place, and time  Deep Tendon Reflexes: Reflexes are normal and symmetric  Comments: Vibration  Sensation slightly diminished to the 1st toe DIP joint bilaterally  Microfilament sensation intact bilaterally except to the heels  Deep tendon reflexes normal      Patient's shoes and socks removed  Right Foot/Ankle   Right Foot Inspection  Skin Exam: skin normal, skin intact, callus and callus no dry skin, no warmth, no erythema, no maceration, no abnormal color, no pre-ulcer and no ulcer                          Toe Exam: ROM and strength within normal limitsno swelling and  no right toe deformity  Sensory   Vibration: diminished    Monofilament testing: intact  Vascular  Capillary refills: < 3 seconds  The right DP pulse is 2+  The right PT pulse is 2+  Left Foot/Ankle  Left Foot Inspection  Skin Exam: skin normal, skin intact and callusno dry skin, no warmth, no erythema, no maceration, normal color, no pre-ulcer and no ulcer                         Toe Exam: ROM and strength within normal limitsno swelling and no left toe deformity                   Sensory   Vibration: diminished    Monofilament: intact  Vascular  Capillary refills: < 3 seconds  The left DP pulse is 2+  The left PT pulse is 2+  Assign Risk Category:  No deformity present; Loss of protective sensation; No weak pulses       Risk: 1        The history was obtained from the review of the chart and from the patient and wife  Lab Results:    Most recent Alc is  Lab Results   Component Value Date    HGBA1C 7 4 (H) 02/15/2021           Blood work done on 02/15/2021 showed a CMP with a glucose of 130 fasting but was otherwise normal     Lab Results   Component Value Date    CREATININE 0 80 02/15/2021    CREATININE 0 76 07/20/2020    CREATININE 0 81 01/21/2020    BUN 13 02/15/2021    K 4 3 02/15/2021     02/15/2021    CO2 26 02/15/2021     Lab Results   Component Value Date    HDL 45 07/20/2020    TRIG 90 07/20/2020     Lab Results   Component Value Date    ALT 21 02/15/2021    AST 17 02/15/2021    ALKPHOS 52 02/15/2021     Lab Results   Component Value Date    TSH 4 64 (H) 02/15/2021    FREET4 1 1 02/15/2021       Urine microalbumin to creatinine ratio is 2      Future Appointments   Date Time Provider Lloyd Gee   8/18/2021  2:20 PM Norma Street MD [facility]

## 2021-02-17 NOTE — PATIENT INSTRUCTIONS
The hgba1c is 7 4%  this is stable  For now, continue to watch the diet and exercise regularly  continue the same janumet  Continue to test blood sugars 1-2 times a day  The thyroid blood work is still slightly underactive but stable  we'l follow it over time  Follow up in 6 months with blood work

## 2021-03-04 DIAGNOSIS — Z23 ENCOUNTER FOR IMMUNIZATION: ICD-10-CM

## 2021-03-17 ENCOUNTER — IMMUNIZATIONS (OUTPATIENT)
Dept: FAMILY MEDICINE CLINIC | Facility: HOSPITAL | Age: 71
End: 2021-03-17

## 2021-03-17 DIAGNOSIS — Z23 ENCOUNTER FOR IMMUNIZATION: Primary | ICD-10-CM

## 2021-03-17 PROCEDURE — 0001A SARS-COV-2 / COVID-19 MRNA VACCINE (PFIZER-BIONTECH) 30 MCG: CPT

## 2021-03-17 PROCEDURE — 91300 SARS-COV-2 / COVID-19 MRNA VACCINE (PFIZER-BIONTECH) 30 MCG: CPT

## 2021-04-08 ENCOUNTER — IMMUNIZATIONS (OUTPATIENT)
Dept: FAMILY MEDICINE CLINIC | Facility: HOSPITAL | Age: 71
End: 2021-04-08

## 2021-04-08 DIAGNOSIS — Z23 ENCOUNTER FOR IMMUNIZATION: Primary | ICD-10-CM

## 2021-04-08 PROCEDURE — 0002A SARS-COV-2 / COVID-19 MRNA VACCINE (PFIZER-BIONTECH) 30 MCG: CPT

## 2021-04-08 PROCEDURE — 91300 SARS-COV-2 / COVID-19 MRNA VACCINE (PFIZER-BIONTECH) 30 MCG: CPT

## 2021-06-27 DIAGNOSIS — E11.8 TYPE 2 DIABETES MELLITUS WITH COMPLICATION (HCC): ICD-10-CM

## 2021-06-27 RX ORDER — BLOOD SUGAR DIAGNOSTIC
STRIP MISCELLANEOUS
Qty: 100 STRIP | Refills: 1 | Status: SHIPPED | OUTPATIENT
Start: 2021-06-27 | End: 2022-01-31

## 2021-08-03 DIAGNOSIS — E11.65 TYPE 2 DIABETES MELLITUS WITH HYPERGLYCEMIA, WITHOUT LONG-TERM CURRENT USE OF INSULIN (HCC): ICD-10-CM

## 2021-08-03 DIAGNOSIS — E11.8 TYPE 2 DIABETES MELLITUS WITH COMPLICATION (HCC): ICD-10-CM

## 2021-08-03 RX ORDER — SITAGLIPTIN AND METFORMIN HYDROCHLORIDE 500; 50 MG/1; MG/1
TABLET, FILM COATED ORAL
Qty: 90 TABLET | Refills: 3 | Status: SHIPPED | OUTPATIENT
Start: 2021-08-03

## 2021-08-03 RX ORDER — SITAGLIPTIN AND METFORMIN HYDROCHLORIDE 1000; 50 MG/1; MG/1
TABLET, FILM COATED ORAL
Qty: 90 TABLET | Refills: 3 | Status: SHIPPED | OUTPATIENT
Start: 2021-08-03

## 2021-08-15 LAB
ALBUMIN SERPL-MCNC: 4.3 G/DL (ref 3.6–5.1)
ALBUMIN/CREAT UR: 2 MCG/MG CREAT
ALBUMIN/GLOB SERPL: 2 (CALC) (ref 1–2.5)
ALP SERPL-CCNC: 54 U/L (ref 35–144)
ALT SERPL-CCNC: 22 U/L (ref 9–46)
AST SERPL-CCNC: 17 U/L (ref 10–35)
BASOPHILS # BLD AUTO: 97 CELLS/UL (ref 0–200)
BASOPHILS NFR BLD AUTO: 1.2 %
BILIRUB SERPL-MCNC: 0.4 MG/DL (ref 0.2–1.2)
BUN SERPL-MCNC: 14 MG/DL (ref 7–25)
BUN/CREAT SERPL: ABNORMAL (CALC) (ref 6–22)
CALCIUM SERPL-MCNC: 9.3 MG/DL (ref 8.6–10.3)
CHLORIDE SERPL-SCNC: 104 MMOL/L (ref 98–110)
CHOLEST SERPL-MCNC: 149 MG/DL
CHOLEST/HDLC SERPL: 3.6 (CALC)
CO2 SERPL-SCNC: 24 MMOL/L (ref 20–32)
CREAT SERPL-MCNC: 0.77 MG/DL (ref 0.7–1.25)
CREAT UR-MCNC: 119 MG/DL (ref 20–320)
EOSINOPHIL # BLD AUTO: 219 CELLS/UL (ref 15–500)
EOSINOPHIL NFR BLD AUTO: 2.7 %
ERYTHROCYTE [DISTWIDTH] IN BLOOD BY AUTOMATED COUNT: 13.1 % (ref 11–15)
EST. AVERAGE GLUCOSE BLD GHB EST-MCNC: 177 (CALC)
EST. AVERAGE GLUCOSE BLD GHB EST-SCNC: 9.8 (CALC)
GLOBULIN SER CALC-MCNC: 2.1 G/DL (CALC) (ref 1.9–3.7)
GLUCOSE SERPL-MCNC: 164 MG/DL (ref 65–99)
HBA1C MFR BLD: 7.8 % OF TOTAL HGB
HCT VFR BLD AUTO: 41.8 % (ref 38.5–50)
HDLC SERPL-MCNC: 41 MG/DL
HGB BLD-MCNC: 13.6 G/DL (ref 13.2–17.1)
LDLC SERPL CALC-MCNC: 87 MG/DL (CALC)
LYMPHOCYTES # BLD AUTO: 2025 CELLS/UL (ref 850–3900)
LYMPHOCYTES NFR BLD AUTO: 25 %
MCH RBC QN AUTO: 28.7 PG (ref 27–33)
MCHC RBC AUTO-ENTMCNC: 32.5 G/DL (ref 32–36)
MCV RBC AUTO: 88.2 FL (ref 80–100)
MICROALBUMIN UR-MCNC: 0.2 MG/DL
MONOCYTES # BLD AUTO: 575 CELLS/UL (ref 200–950)
MONOCYTES NFR BLD AUTO: 7.1 %
NEUTROPHILS # BLD AUTO: 5184 CELLS/UL (ref 1500–7800)
NEUTROPHILS NFR BLD AUTO: 64 %
NONHDLC SERPL-MCNC: 108 MG/DL (CALC)
PLATELET # BLD AUTO: 245 THOUSAND/UL (ref 140–400)
PMV BLD REES-ECKER: 10.2 FL (ref 7.5–12.5)
POTASSIUM SERPL-SCNC: 4.4 MMOL/L (ref 3.5–5.3)
PROT SERPL-MCNC: 6.4 G/DL (ref 6.1–8.1)
RBC # BLD AUTO: 4.74 MILLION/UL (ref 4.2–5.8)
SL AMB EGFR AFRICAN AMERICAN: 107 ML/MIN/1.73M2
SL AMB EGFR NON AFRICAN AMERICAN: 93 ML/MIN/1.73M2
SODIUM SERPL-SCNC: 136 MMOL/L (ref 135–146)
T4 FREE SERPL-MCNC: 1.2 NG/DL (ref 0.8–1.8)
TRIGL SERPL-MCNC: 117 MG/DL
TSH SERPL-ACNC: 3.81 MIU/L (ref 0.4–4.5)
WBC # BLD AUTO: 8.1 THOUSAND/UL (ref 3.8–10.8)

## 2021-08-15 PROCEDURE — 3051F HG A1C>EQUAL 7.0%<8.0%: CPT | Performed by: INTERNAL MEDICINE

## 2021-08-15 PROCEDURE — 3061F NEG MICROALBUMINURIA REV: CPT | Performed by: INTERNAL MEDICINE

## 2021-08-18 ENCOUNTER — OFFICE VISIT (OUTPATIENT)
Dept: ENDOCRINOLOGY | Facility: HOSPITAL | Age: 71
End: 2021-08-18
Payer: COMMERCIAL

## 2021-08-18 VITALS
HEIGHT: 72 IN | HEART RATE: 70 BPM | WEIGHT: 232.2 LBS | SYSTOLIC BLOOD PRESSURE: 118 MMHG | BODY MASS INDEX: 31.45 KG/M2 | DIASTOLIC BLOOD PRESSURE: 64 MMHG

## 2021-08-18 DIAGNOSIS — I10 ESSENTIAL HYPERTENSION: ICD-10-CM

## 2021-08-18 DIAGNOSIS — E11.65 TYPE 2 DIABETES MELLITUS WITH HYPERGLYCEMIA, WITHOUT LONG-TERM CURRENT USE OF INSULIN (HCC): Primary | ICD-10-CM

## 2021-08-18 DIAGNOSIS — R79.89 ELEVATED TSH: ICD-10-CM

## 2021-08-18 DIAGNOSIS — E11.42 DIABETIC POLYNEUROPATHY ASSOCIATED WITH TYPE 2 DIABETES MELLITUS (HCC): ICD-10-CM

## 2021-08-18 DIAGNOSIS — E06.3 HASHIMOTO'S THYROIDITIS: ICD-10-CM

## 2021-08-18 PROCEDURE — 3078F DIAST BP <80 MM HG: CPT | Performed by: INTERNAL MEDICINE

## 2021-08-18 PROCEDURE — 3008F BODY MASS INDEX DOCD: CPT | Performed by: INTERNAL MEDICINE

## 2021-08-18 PROCEDURE — 99215 OFFICE O/P EST HI 40 MIN: CPT | Performed by: INTERNAL MEDICINE

## 2021-08-18 PROCEDURE — 3074F SYST BP LT 130 MM HG: CPT | Performed by: INTERNAL MEDICINE

## 2021-08-18 PROCEDURE — 1160F RVW MEDS BY RX/DR IN RCRD: CPT | Performed by: INTERNAL MEDICINE

## 2021-08-18 RX ORDER — CALCIUM CARBONATE/VITAMIN D3 600 MG-20
TABLET ORAL 2 TIMES DAILY
COMMUNITY

## 2021-08-18 NOTE — PROGRESS NOTES
8/20/2021    Assessment/Plan      Diagnoses and all orders for this visit:    Type 2 diabetes mellitus with hyperglycemia, without long-term current use of insulin ([facility] [address] )  -     HEMOGLOBIN A1C W/ EAG ESTIMATION Lab Collect; Future  -     Comprehensive metabolic panel Lab Collect; Future  -     TSH, 3rd generation Lab Collect; Future  -     T4, free Lab Collect; Future    Diabetic polyneuropathy associated with type 2 diabetes mellitus (HCC)  -     HEMOGLOBIN A1C W/ EAG ESTIMATION Lab Collect; Future  -     Comprehensive metabolic panel Lab Collect; Future  -     TSH, 3rd generation Lab Collect; Future  -     T4, free Lab Collect; Future    Hashimoto's thyroiditis  -     HEMOGLOBIN A1C W/ EAG ESTIMATION Lab Collect; Future  -     Comprehensive metabolic panel Lab Collect; Future  -     TSH, 3rd generation Lab Collect; Future  -     T4, free Lab Collect; Future    Elevated TSH  -     HEMOGLOBIN A1C W/ EAG ESTIMATION Lab Collect; Future  -     Comprehensive metabolic panel Lab Collect; Future  -     TSH, 3rd generation Lab Collect; Future  -     T4, free Lab Collect; Future    Essential hypertension  -     HEMOGLOBIN A1C W/ EAG ESTIMATION Lab Collect; Future  -     Comprehensive metabolic panel Lab Collect; Future  -     TSH, 3rd generation Lab Collect; Future  -     T4, free Lab Collect; Future    Other orders  -     Calcium Carb-Cholecalciferol (Caltrate 600+D3) 600-800 MG-UNIT TABS; Take by mouth 2 (two) times a day        Assessment/Plan:   1  Type 2 diabetes  Hemoglobin A1c is 7 8%  This is a bit higher but may be due to some dietary indiscretion  For now, he will continue the same Janumet and work on dietary changes with portion control, exercise, and weight loss  He has been checking his blood sugars immediately after meals and I have asked him to wait and check his blood sugars 2 hours after meals  2  Diabetic neuropathy  He has unchanged neuropathic symptoms    Diabetic foot exams are up-to-date  3  Hashimoto's thyroiditis with history of elevated TSH  4  Hypertension  Blood pressure is under good control on his current dose of amlodipine  I have asked him to follow up in 6 months with preceding hemoglobin A1c, CMP, TSH, and free T4       CC: Diabetes Type 2, blood pressure, thyroid follow-up    History of Present Illness     HPI: Hugo Bolton is a 73y o  year old male with type 2 diabetes with neuropathy for 21 years, hypertension, elevated TSH with Hashimoto's thyroid disease for follow-up visit  He is on oral agents at home and takes  Janumet 50/1000 mg in a m  and 50/500 mg in p m  He denies any polydipsia and polyphagia  He has polyuria and nocturia 3 times a night  He has blurry vision with a cataract  He has stable numbness and tingling of the feet  He denies chest pain or shortness of breath  He denies nephropathy, retinopathy, heart attack, stroke and claudication but does admit to neuropathy  Hypoglycemic episodes: No never  The patient's last eye exam was in August 2020 with no retinopathy  The patient's last foot exam was in  February 2021 at endocrine office visit  Has been seeing a podiatrist  Last A1C was   Lab Results   Component Value Date    HGBA1C 7 8 (H) 08/14/2021     Blood Sugar/Glucometer/Pump/CGM review: checks blood sugars once daily in am  Reports mostly over 200 or in upper 100s  Checks immediately after eating  He has a history of Hashimoto's thyroid disease with elevated TSH  He has not been interested in taking thyroid hormone as is TSH is only slightly above normal   His weight is 4 lb more than last visit  He is fatigued  He denies heat or cold intolerance, palpitation, tremors, or insomnia  He denies diarrhea or constipation  He has hypertension and takes amlodipine 5 mg twice a day  He has headaches  He denies stroke-like symptoms  Review of Systems   Constitutional: Positive for fatigue   Negative for unexpected weight change  4 lb more than February 2021  HENT: Negative for trouble swallowing  Eyes: Positive for visual disturbance  Has blurry vision with a cataract   Respiratory: Negative for chest tightness and shortness of breath  Cardiovascular: Negative for chest pain and palpitations  Gastrointestinal: Positive for abdominal distention and abdominal pain  Negative for constipation, diarrhea and nausea  Occasional bloating with pain  Can be this way after eats or drinks water  Endocrine: Positive for polyuria  Negative for cold intolerance, heat intolerance, polydipsia and polyphagia  Nocturia 3 times a night  Skin: Positive for wound  Negative for rash  Neurological: Positive for light-headedness and numbness  Negative for dizziness, tremors, weakness and headaches  Stable numbness or tingling of the feet  Lightheaded at times  Psychiatric/Behavioral: Negative for sleep disturbance  Historical Information   Past Medical History:   Diagnosis Date    Fatty liver     Hiatal hernia      History reviewed  No pertinent surgical history    Social History   Social History     Substance and Sexual Activity   Alcohol Use Yes    Comment: social     Social History     Substance and Sexual Activity   Drug Use No     Social History     Tobacco Use   Smoking Status Never Smoker   Smokeless Tobacco Never Used     Family History:   Family History   Problem Relation Age of Onset    Hypertension Mother     Heart disease Father         lung collapsed, several heart attacks    Diabetes type II Sister     Hypertension Sister     Diabetes type II Brother     Heart disease Brother     Hypertension Brother     Diabetes type II Sister     Hypertension Sister     Lucio's disease Sister     Hypertension Sister     Hypertension Sister     Hypertension Sister        Meds/Allergies   Current Outpatient Medications   Medication Sig Dispense Refill    amLODIPine (NORVASC) 5 mg tablet Take 5 mg by mouth 2 (two) times a day       Calcium Carb-Cholecalciferol (Caltrate 600+D3) 600-800 MG-UNIT TABS Take by mouth 2 (two) times a day      Janumet  MG per tablet TAKE 1 TABLET DAILY AFTER BREAKFAST (ALSO TAKES THE JANUMET 50/500 MG IN THE EVENING) 90 tablet 3    Janumet  MG per tablet TAKE 1 TABLET DAILY AFTER DINNER 90 tablet 3    multivitamin-minerals (CENTRUM) tablet Take 1 tablet by mouth daily      Omega-3 Fatty Acids (FISH OIL) 1,000 mg Take 1,000 mg by mouth daily      OneTouch Ultra test strip USE TO CHECK BLOOD SUGAR ONCE DAILY 100 strip 1    Zinc Sulfate (ZINC 15 PO) Take by mouth 1/2 prn       No current facility-administered medications for this visit  Allergies   Allergen Reactions    Actos [Pioglitazone] GI Intolerance     Significant GI upset       Objective   Vitals: Blood pressure 118/64, pulse 70, height 6' (1 829 m), weight 105 kg (232 lb 3 2 oz)  Invasive Devices     None                 Physical Exam  Vitals reviewed  Constitutional:       Appearance: Normal appearance  He is well-developed  HENT:      Head: Normocephalic and atraumatic  Eyes:      Conjunctiva/sclera: Conjunctivae normal    Neck:      Thyroid: No thyromegaly  Vascular: No carotid bruit  Comments: Thyroid normal in size  Cardiovascular:      Rate and Rhythm: Normal rate and regular rhythm  Heart sounds: Normal heart sounds  No murmur heard  Comments: 1+ dorsalis pedis and posterior tibialis pulses bilaterally  Pulmonary:      Effort: Pulmonary effort is normal       Breath sounds: Normal breath sounds  No wheezing  Abdominal:      Palpations: Abdomen is soft  Musculoskeletal:         General: No deformity  Normal range of motion  Cervical back: Normal range of motion and neck supple  Right lower leg: No edema  Left lower leg: No edema  Comments: No ulcerations of the feet     Lymphadenopathy:      Cervical: No cervical adenopathy  Skin:     General: Skin is warm and dry  Findings: No erythema or rash  Neurological:      Mental Status: He is alert and oriented to person, place, and time  Deep Tendon Reflexes: Reflexes are normal and symmetric  The history was obtained from the review of the chart and from the patient and wife  Lab Results:    Most recent Alc is  Lab Results   Component Value Date    HGBA1C 7 8 (H) 08/14/2021             Blood work done on 08/14/2021 showed a CMP with a glucose of 164 fasting but was otherwise normal     Lab Results   Component Value Date    CREATININE 0 77 08/14/2021    CREATININE 0 80 02/15/2021    CREATININE 0 76 07/20/2020    BUN 14 08/14/2021    K 4 4 08/14/2021     08/14/2021    CO2 24 08/14/2021       Total cholesterol 149, LDL cholesterol 87  Lab Results   Component Value Date    HDL 41 08/14/2021    TRIG 117 08/14/2021       Lab Results   Component Value Date    ALT 22 08/14/2021    AST 17 08/14/2021    ALKPHOS 54 08/14/2021       Lab Results   Component Value Date    TSH 3 81 08/14/2021    FREET4 1 2 08/14/2021      urine microalbumin to creatinine ratio was 2        CBC is normal       Future Appointments   Date Time Provider [facility] Gee   2/21/2022  1:00 PM Ivonne Irvin MD [facility]

## 2021-08-18 NOTE — PATIENT INSTRUCTIONS
hgba1c is up to 7 8%  this is higher  No change in the janumet for now  Work on [employer], exercise, and weight loss  Checks blood sugars 2 hours after a meal, not immediately after a meal   Ok to take the vitamin E and vitamin C  Follow up in 6 months with blood work

## 2021-08-26 LAB
LEFT EYE DIABETIC RETINOPATHY: NORMAL
RIGHT EYE DIABETIC RETINOPATHY: NORMAL

## 2022-01-31 DIAGNOSIS — E11.8 TYPE 2 DIABETES MELLITUS WITH COMPLICATION (HCC): ICD-10-CM

## 2022-01-31 RX ORDER — BLOOD SUGAR DIAGNOSTIC
STRIP MISCELLANEOUS
Qty: 100 STRIP | Refills: 1 | Status: SHIPPED | OUTPATIENT
Start: 2022-01-31

## 2022-02-18 LAB
ALBUMIN SERPL-MCNC: 4.5 G/DL (ref 3.6–5.1)
ALBUMIN/GLOB SERPL: 1.7 (CALC) (ref 1–2.5)
ALP SERPL-CCNC: 63 U/L (ref 35–144)
ALT SERPL-CCNC: 18 U/L (ref 9–46)
AST SERPL-CCNC: 16 U/L (ref 10–35)
BILIRUB SERPL-MCNC: 0.5 MG/DL (ref 0.2–1.2)
BUN SERPL-MCNC: 13 MG/DL (ref 7–25)
BUN/CREAT SERPL: ABNORMAL (CALC) (ref 6–22)
CALCIUM SERPL-MCNC: 9.6 MG/DL (ref 8.6–10.3)
CHLORIDE SERPL-SCNC: 103 MMOL/L (ref 98–110)
CO2 SERPL-SCNC: 29 MMOL/L (ref 20–32)
CREAT SERPL-MCNC: 0.84 MG/DL (ref 0.7–1.25)
EST. AVERAGE GLUCOSE BLD GHB EST-MCNC: 169 MG/DL
EST. AVERAGE GLUCOSE BLD GHB EST-SCNC: 9.3 MMOL/L
GLOBULIN SER CALC-MCNC: 2.6 G/DL (CALC) (ref 1.9–3.7)
GLUCOSE SERPL-MCNC: 145 MG/DL (ref 65–99)
HBA1C MFR BLD: 7.5 % OF TOTAL HGB
POTASSIUM SERPL-SCNC: 4.3 MMOL/L (ref 3.5–5.3)
PROT SERPL-MCNC: 7.1 G/DL (ref 6.1–8.1)
SL AMB EGFR AFRICAN AMERICAN: 104 ML/MIN/1.73M2
SL AMB EGFR NON AFRICAN AMERICAN: 89 ML/MIN/1.73M2
SODIUM SERPL-SCNC: 140 MMOL/L (ref 135–146)
T4 FREE SERPL-MCNC: 1.2 NG/DL (ref 0.8–1.8)
TSH SERPL-ACNC: 5.39 MIU/L (ref 0.4–4.5)

## 2022-02-21 ENCOUNTER — OFFICE VISIT (OUTPATIENT)
Dept: ENDOCRINOLOGY | Facility: HOSPITAL | Age: 72
End: 2022-02-21
Payer: COMMERCIAL

## 2022-02-21 VITALS
HEART RATE: 76 BPM | WEIGHT: 230.6 LBS | BODY MASS INDEX: 31.23 KG/M2 | HEIGHT: 72 IN | DIASTOLIC BLOOD PRESSURE: 72 MMHG | SYSTOLIC BLOOD PRESSURE: 142 MMHG

## 2022-02-21 DIAGNOSIS — E11.42 DIABETIC POLYNEUROPATHY ASSOCIATED WITH TYPE 2 DIABETES MELLITUS (HCC): ICD-10-CM

## 2022-02-21 DIAGNOSIS — R79.89 ELEVATED TSH: ICD-10-CM

## 2022-02-21 DIAGNOSIS — I10 PRIMARY HYPERTENSION: ICD-10-CM

## 2022-02-21 DIAGNOSIS — E11.65 TYPE 2 DIABETES MELLITUS WITH HYPERGLYCEMIA, WITHOUT LONG-TERM CURRENT USE OF INSULIN (HCC): Primary | ICD-10-CM

## 2022-02-21 DIAGNOSIS — E06.3 HASHIMOTO'S THYROIDITIS: ICD-10-CM

## 2022-02-21 PROCEDURE — 99215 OFFICE O/P EST HI 40 MIN: CPT | Performed by: INTERNAL MEDICINE

## 2022-02-21 RX ORDER — VITAMIN E 268 MG
400 CAPSULE ORAL DAILY
COMMUNITY

## 2022-02-21 RX ORDER — MULTIVIT-MIN/IRON/FOLIC ACID/K 18-600-40
CAPSULE ORAL DAILY
COMMUNITY

## 2022-02-21 NOTE — PATIENT INSTRUCTIONS
hgba1c is 7 5%  this is improved  continue the same janumet  Work on [employer]  Continue to test blood sugars 1-2 times a day  continue to drink plenty of water  follow up in 6 months with blood work

## 2022-02-21 NOTE — PROGRESS NOTES
2/21/2022    Assessment/Plan      Diagnoses and all orders for this visit:    Type 2 diabetes mellitus with hyperglycemia, without long-term current use of insulin ([facility] [address] )  -     HEMOGLOBIN A1C W/ EAG ESTIMATION Lab Collect; Future  -     Comprehensive metabolic panel Lab Collect; Future  -     CBC and differential Lab Collect; Future  -     Lipid Panel with Direct LDL reflex Lab Collect; Future  -     Microalbumin / creatinine urine ratio Lab Collect; Future  -     T4, free Lab Collect; Future  -     TSH, 3rd generation Lab Collect; Future    Diabetic polyneuropathy associated with type 2 diabetes mellitus (HCC)  -     HEMOGLOBIN A1C W/ EAG ESTIMATION Lab Collect; Future  -     Comprehensive metabolic panel Lab Collect; Future  -     CBC and differential Lab Collect; Future  -     Lipid Panel with Direct LDL reflex Lab Collect; Future  -     Microalbumin / creatinine urine ratio Lab Collect; Future  -     T4, free Lab Collect; Future  -     TSH, 3rd generation Lab Collect; Future    Hashimoto's thyroiditis  -     HEMOGLOBIN A1C W/ EAG ESTIMATION Lab Collect; Future  -     Comprehensive metabolic panel Lab Collect; Future  -     CBC and differential Lab Collect; Future  -     Lipid Panel with Direct LDL reflex Lab Collect; Future  -     Microalbumin / creatinine urine ratio Lab Collect; Future  -     T4, free Lab Collect; Future  -     TSH, 3rd generation Lab Collect; Future    Elevated TSH  -     HEMOGLOBIN A1C W/ EAG ESTIMATION Lab Collect; Future  -     Comprehensive metabolic panel Lab Collect; Future  -     CBC and differential Lab Collect; Future  -     Lipid Panel with Direct LDL reflex Lab Collect; Future  -     Microalbumin / creatinine urine ratio Lab Collect; Future  -     T4, free Lab Collect; Future  -     TSH, 3rd generation Lab Collect; Future    Primary hypertension  -     HEMOGLOBIN A1C W/ EAG ESTIMATION Lab Collect; Future  -     Comprehensive metabolic panel Lab Collect;  Future  -     CBC and differential Lab Collect; Future  -     Lipid Panel with Direct LDL reflex Lab Collect; Future  -     Microalbumin / creatinine urine ratio Lab Collect; Future  -     T4, free Lab Collect; Future  -     TSH, 3rd generation Lab Collect; Future    Other orders  -     Ascorbic Acid (Vitamin C) 500 MG CAPS; Take by mouth daily  -     Specialty Vitamins Products (PROSTATE PO); Take by mouth 2 (two) times a day  -     vitamin E, tocopherol, 400 units capsule; Take 400 Units by mouth daily        Assessment/Plan:  1  Type 2 diabetes  Hemoglobin A1c is 7 5%  This is somewhat improved  For now, he will continue same Janumet dosages  He will continue to work on dietary changes exercise  I have encouraged him to increase his water intake  He will continue to test his blood sugars once or twice a day  2  Diabetic neuropathy  He denies neuropathic symptoms  Diabetic foot exam was performed in the office today  He does follow with Podiatry now  3  Hashimoto's thyroiditis with elevated TSH  He has been resistant to starting any thyroid hormone unless he absolutely has to  TSH has fluctuated up and down and most recently it is slightly higher  It is still not at a point where he has to use thyroid hormone replacement  4  Hypertension  Blood pressure is under fair control on his current dose of amlodipine 5 mg twice a day  I have asked him to follow up in 6 months with preceding hemoglobin A1c, CMP, CBC, TSH, free T4, lipid panel, and urine microalbumin to creatinine ratio  CC: Diabetes type 2, thyroid, blood pressure follow-up    History of Present Illness     HPI: Cha Lyn is a 73y o  year old male with type 2 diabetes with neuropathy for 21 years, hypertension, Hashimoto's thyroiditis for follow-up visit  He is on oral agents at home and takes Janumet 50/500 mg at supper and Janumet 50/1000 mg at breakfast  He denies any polyphagia, polydipsia, and blurry vision    He has polyuria and 2-3 times per night nocturia  He denies numbness or tingling of the feet  He denies chest pain or shortness of breath  He denies nephropathy, retinopathy, heart attack, stroke and claudication but does admit to neuropathy  Hypoglycemic episodes: Yes rare  The patient's last eye exam was in August 2020  The patient's last foot exam was in February 2021 at endocrine office visit  He does see a podiatrist with last visit around dec 2021  Last A1C was   Lab Results   Component Value Date    HGBA1C 7 5 (H) 02/17/2022     Blood Sugar/Glucometer/Pump/CGM review: checks 1-2 times daily pre or 2 hours after a meal, as high as high 100s to low 200s  He has a history of Hashimoto's thyroiditis with mildly elevated TSH  He has not been interested in taking thyroid hormone as his TSH is only slightly above normal     He has hypertension and takes amlodipine 5 mg twice a day  He denies headache or stroke-like symptoms  Review of Systems   Constitutional: Negative for fatigue and unexpected weight change  Weight 2 lb less than August 2021  HENT: Negative for trouble swallowing  Eyes: Negative for visual disturbance  Right eye cataract done nov 2021, feels irritated and is red  No blurry vision  Has needed to follow up regularly for treatment, was on prednisone eye drops  To use an ointment on the eyelid  Respiratory: Negative for chest tightness and shortness of breath  Cardiovascular: Negative for chest pain  Gastrointestinal: Positive for abdominal distention  Negative for abdominal pain, constipation, diarrhea and nausea  Always feels bloated in abdomen  Has been evaluated in the past     Endocrine: Positive for polyuria  Negative for polydipsia and polyphagia  Nocturia 2-3 times  Genitourinary:        Saw urology   Skin: Negative for wound  Neurological: Negative for dizziness, weakness, light-headedness, numbness and headaches     Psychiatric/Behavioral: Negative for sleep disturbance         Historical Information   Past Medical History:   Diagnosis Date    Fatty liver     Hiatal hernia      Past Surgical History:   Procedure Laterality Date    CATARACT EXTRACTION Right 11/2021    CATARACT EXTRACTION Left 2019     Social History   Social History     Substance and Sexual Activity   Alcohol Use Yes    Comment: social     Social History     Substance and Sexual Activity   Drug Use No     Social History     Tobacco Use   Smoking Status Never Smoker   Smokeless Tobacco Never Used     Family History:   Family History   Problem Relation Age of Onset    Hypertension Mother     Heart disease Father         lung collapsed, several heart attacks    Diabetes type II Sister     Hypertension Sister     Diabetes type II Brother     Heart disease Brother     Hypertension Brother     Diabetes type II Sister     Hypertension Sister     Blue's disease Sister     Hypertension Sister     Hypertension Sister     Hypertension Sister        Meds/Allergies   Current Outpatient Medications   Medication Sig Dispense Refill    amLODIPine (NORVASC) 5 mg tablet Take 5 mg by mouth 2 (two) times a day       Ascorbic Acid (Vitamin C) 500 MG CAPS Take by mouth daily      Calcium Carb-Cholecalciferol (Caltrate 600+D3) 600-800 MG-UNIT TABS Take by mouth 2 (two) times a day      Janumet  MG per tablet TAKE 1 TABLET DAILY AFTER BREAKFAST (ALSO TAKES THE JANUMET 50/500 MG IN THE EVENING) 90 tablet 3    Janumet  MG per tablet TAKE 1 TABLET DAILY AFTER DINNER 90 tablet 3    multivitamin-minerals (CENTRUM) tablet Take 1 tablet by mouth daily      Omega-3 Fatty Acids (FISH OIL) 1,000 mg Take 1,000 mg by mouth daily      OneTouch Ultra test strip USE TO CHECK BLOOD SUGAR ONCE DAILY 100 strip 1    Specialty Vitamins Products (PROSTATE PO) Take by mouth 2 (two) times a day      vitamin E, tocopherol, 400 units capsule Take 400 Units by mouth daily      Zinc Sulfate (ZINC 15 PO) Take by mouth 1/2 prn (Patient not taking: Reported on 2/21/2022 )       No current facility-administered medications for this visit  Allergies   Allergen Reactions    Actos [Pioglitazone] GI Intolerance     Significant GI upset       Objective   Vitals: Blood pressure 142/72, pulse 76, height 6' (1 829 m), weight 105 kg (230 lb 9 6 oz)  Invasive Devices  Report    None                 Physical Exam  Vitals reviewed  Constitutional:       Appearance: Normal appearance  He is well-developed  HENT:      Head: Normocephalic and atraumatic  Eyes:      Conjunctiva/sclera: Conjunctivae normal    Neck:      Thyroid: No thyromegaly  Vascular: No carotid bruit  Comments: Thyroid normal in size  No palpable thyroid nodules  No bruits over the thyroid gland  Cardiovascular:      Rate and Rhythm: Normal rate and regular rhythm  Pulses: Pulses are weak  Dorsalis pedis pulses are 1+ on the right side and 1+ on the left side  Posterior tibial pulses are 1+ on the right side and 1+ on the left side  Heart sounds: Normal heart sounds  No murmur heard  Comments: 1+ dorsalis pedis and posterior tibialis pulses bilaterally  Pulmonary:      Effort: Pulmonary effort is normal       Breath sounds: Normal breath sounds  No wheezing  Abdominal:      Palpations: Abdomen is soft  Musculoskeletal:         General: No deformity  Normal range of motion  Cervical back: Normal range of motion and neck supple  Right lower leg: No edema  Left lower leg: No edema  Comments: Callus of the heels and 2-3rd MP joints bilaterally  No ulcerations of the feet  No tremor of the outstretched hands  Feet:      Right foot:      Skin integrity: Callus present  No ulcer, skin breakdown, erythema, warmth or dry skin  Left foot:      Skin integrity: Callus present  No ulcer, skin breakdown, erythema, warmth or dry skin     Lymphadenopathy:      Cervical: No cervical adenopathy  Skin:     General: Skin is warm and dry  Findings: No erythema or rash  Neurological:      Mental Status: He is alert and oriented to person, place, and time  Deep Tendon Reflexes: Reflexes are normal and symmetric  Comments: Vibration sensation diminished to the 1st toe DIP joint bilaterally  microfilament sensation intact bilaterally  Deep tendon reflexes normal        Patient's shoes and socks removed  Right Foot/Ankle   Right Foot Inspection  Skin Exam: skin normal, skin intact, callus and callus  No dry skin, no warmth, no erythema, no maceration, no abnormal color, no pre-ulcer and no ulcer  Toe Exam: ROM and strength within normal limits  No swelling and  no right toe deformity    Sensory   Vibration: diminished  Monofilament testing: intact    Vascular  Capillary refills: < 3 seconds  The right DP pulse is 1+  The right PT pulse is 1+  Left Foot/Ankle  Left Foot Inspection  Skin Exam: skin normal, skin intact and callus  No dry skin, no warmth, no erythema, no maceration, normal color, no pre-ulcer and no ulcer  Toe Exam: ROM and strength within normal limits  No swelling and no left toe deformity  Sensory   Vibration: diminished  Monofilament testing: intact    Vascular  Capillary refills: < 3 seconds  The left DP pulse is 1+  The left PT pulse is 1+  Assign Risk Category  No deformity present  Loss of protective sensation  Weak pulses  Risk: 2        The history was obtained from the review of the chart and from the patient and wife      Lab Results:    Most recent Alc is  Lab Results   Component Value Date    HGBA1C 7 5 (H) 02/17/2022           Blood work done on 02/17/2022 showed a CMP with a glucose of 145 fasting but was otherwise normal   Lab Results   Component Value Date    CREATININE 0 84 02/17/2022    CREATININE 0 77 08/14/2021    CREATININE 0 80 02/15/2021    BUN 13 02/17/2022    K 4 3 02/17/2022     02/17/2022    CO2 29 02/17/2022       Lab Results   Component Value Date    HDL 41 08/14/2021    TRIG 117 08/14/2021       Lab Results   Component Value Date    ALT 18 02/17/2022    AST 16 02/17/2022    ALKPHOS 63 02/17/2022       Lab Results   Component Value Date    TSH 5 39 (H) 02/17/2022    FREET4 1 2 02/17/2022       Future Appointments   Date Time Provider [facility]   8/23/2022  1:20 PM Leanne Cuello MD [facility]

## 2022-02-22 ENCOUNTER — TELEPHONE (OUTPATIENT)
Dept: ADMINISTRATIVE | Facility: OTHER | Age: 72
End: 2022-02-22

## 2022-02-22 NOTE — TELEPHONE ENCOUNTER
Upon review of the In Basket request and the patient's chart, initial outreach has been made via fax, please see Contacts section for details       Thank you  Daya Harding

## 2022-02-22 NOTE — TELEPHONE ENCOUNTER
----- Message from [address] [facility] sent at 2/21/2022  1:15 PM EST -----  Regarding: DM EYE EXAM  02/21/22 1:15 PM    Hello, our patient Natalie James has had a DM Eye Exam performed by Dr Arabella Lezama at [facility]  Their number is [phone]    Thank you,  [facility] [address] [facility] [place]

## 2022-02-22 NOTE — TELEPHONE ENCOUNTER
Upon review of the In Basket request and the patient's chart, initial outreach has been made via fax, please see Contacts section for details       Thank you  Jesse Ellis

## 2022-02-22 NOTE — LETTER
Diabetic Eye Exam Form    Date Requested: 22  Patient: Roxanne Laurent  Patient : 1950   Referring Provider: Marquis Ryann MD    Dilated Retinal Exam, Optomap-Iris Exam, or Fundus Photography Done         Yes ([place] one above)         No     Date of Diabetic Eye Exam ______________________________  Left Eye      Exam did show retinopathy    Exam did not show retinopathy         Mild       Moderate       None       Proliferative       Severe     Right Eye     Exam did show retinopathy    Exam did not show retinopathy         Mild       Moderate       None       Proliferative       Severe     Comments __________________________________________________________    Practice Providing Exam ______________________________________________    Exam Performed By (print name) _______________________________________      Provider Signature ___________________________________________________      These reports are needed for  compliance  Please fax this completed form and a copy of the Diabetic Eye Exam report to our office located at [place] [address] as soon as possible via [phone] dick Arce: Phone [phone]    We thank you for your assistance in treating our mutual patient

## 2022-02-22 NOTE — LETTER
Diabetic Foot Exam Form    Date Requested: 22  Patient: Bora Gregory  Patient : 1950   Referring Provider: Tony Claudio MD    Diabetic Foot Exam Performed with shoes and socks removed        Yes         No     Date of Diabetic Foot Exam ______________________________  Risk Score ____________________________________________    Left Foot       Visual Inspection         Monofilament Testing Sensory Exam        Pedal Pulses         Additional Comments         Right Foot      Visual Inspection         Monofilament Testing Sensory Exam       Pedal Pulses         Additional Comments         Comments __________________________________________________________    Practice Providing Exam ______________________________________________    Exam Performed By (print name) _______________________________________      Provider Signature ___________________________________________________      These reports are needed for  compliance  Please fax this completed form and a copy of the Diabetic Foot Exam report to our office located at [place] [address] as soon as possible via [phone] dick Moran: Phone [phone]    We thank you for your assistance in treating our mutual patient

## 2022-02-22 NOTE — TELEPHONE ENCOUNTER
----- Message from [facility] sent at 2/21/2022  1:17 PM EST -----  Regarding: DM FOOT EXAM  02/21/22 1:17 PM    Hello, our patient Ricardo Said has had a DM Foot Exam performed by Dr Tamra Granados  His number is [phone]      Thank you,  [address] [place] [address] [facility] [place]

## 2022-02-22 NOTE — TELEPHONE ENCOUNTER
Upon review of the In Basket request we were able to locate, review, and update the patient chart as requested for Diabetic Foot Exam     Any additional questions or concerns should be emailed to the Practice Liaisons via [email or web address] email, please do not reply via In Basket      Thank you  Alvaro Figueredo

## 2022-02-24 NOTE — TELEPHONE ENCOUNTER
Upon review of the In Basket request we were able to locate, review, and update the patient chart as requested for Diabetic Eye Exam     Any additional questions or concerns should be emailed to the Practice Liaisons via [email or web address] email, please do not reply via In Basket      Thank you  Jesse Ellis

## 2022-07-28 LAB
ALBUMIN SERPL-MCNC: 4.4 G/DL (ref 3.6–5.1)
ALBUMIN/CREAT UR: 3 MCG/MG CREAT
ALBUMIN/GLOB SERPL: 1.8 (CALC) (ref 1–2.5)
ALP SERPL-CCNC: 60 U/L (ref 35–144)
ALT SERPL-CCNC: 19 U/L (ref 9–46)
AST SERPL-CCNC: 16 U/L (ref 10–35)
BASOPHILS # BLD AUTO: 78 CELLS/UL (ref 0–200)
BASOPHILS NFR BLD AUTO: 0.9 %
BILIRUB SERPL-MCNC: 0.5 MG/DL (ref 0.2–1.2)
BUN SERPL-MCNC: 12 MG/DL (ref 7–25)
BUN/CREAT SERPL: ABNORMAL (CALC) (ref 6–22)
CALCIUM SERPL-MCNC: 9.7 MG/DL (ref 8.6–10.3)
CHLORIDE SERPL-SCNC: 105 MMOL/L (ref 98–110)
CHOLEST SERPL-MCNC: 188 MG/DL
CHOLEST/HDLC SERPL: 4.4 (CALC)
CO2 SERPL-SCNC: 25 MMOL/L (ref 20–32)
CREAT SERPL-MCNC: 0.8 MG/DL (ref 0.7–1.28)
CREAT UR-MCNC: 146 MG/DL (ref 20–320)
EOSINOPHIL # BLD AUTO: 278 CELLS/UL (ref 15–500)
EOSINOPHIL NFR BLD AUTO: 3.2 %
ERYTHROCYTE [DISTWIDTH] IN BLOOD BY AUTOMATED COUNT: 12.9 % (ref 11–15)
EST. AVERAGE GLUCOSE BLD GHB EST-MCNC: 177 MG/DL
EST. AVERAGE GLUCOSE BLD GHB EST-SCNC: 9.8 MMOL/L
GFR/BSA.PRED SERPLBLD CYS-BASED-ARV: 95 ML/MIN/1.73M2
GLOBULIN SER CALC-MCNC: 2.5 G/DL (CALC) (ref 1.9–3.7)
GLUCOSE SERPL-MCNC: 189 MG/DL (ref 65–99)
HBA1C MFR BLD: 7.8 % OF TOTAL HGB
HCT VFR BLD AUTO: 42.7 % (ref 38.5–50)
HDLC SERPL-MCNC: 43 MG/DL
HGB BLD-MCNC: 14.1 G/DL (ref 13.2–17.1)
LDLC SERPL CALC-MCNC: 118 MG/DL (CALC)
LYMPHOCYTES # BLD AUTO: 1810 CELLS/UL (ref 850–3900)
LYMPHOCYTES NFR BLD AUTO: 20.8 %
MCH RBC QN AUTO: 28.4 PG (ref 27–33)
MCHC RBC AUTO-ENTMCNC: 33 G/DL (ref 32–36)
MCV RBC AUTO: 85.9 FL (ref 80–100)
MICROALBUMIN UR-MCNC: 0.4 MG/DL
MONOCYTES # BLD AUTO: 687 CELLS/UL (ref 200–950)
MONOCYTES NFR BLD AUTO: 7.9 %
NEUTROPHILS # BLD AUTO: 5846 CELLS/UL (ref 1500–7800)
NEUTROPHILS NFR BLD AUTO: 67.2 %
NONHDLC SERPL-MCNC: 145 MG/DL (CALC)
PLATELET # BLD AUTO: 261 THOUSAND/UL (ref 140–400)
PMV BLD REES-ECKER: 10.2 FL (ref 7.5–12.5)
POTASSIUM SERPL-SCNC: 4.2 MMOL/L (ref 3.5–5.3)
PROT SERPL-MCNC: 6.9 G/DL (ref 6.1–8.1)
RBC # BLD AUTO: 4.97 MILLION/UL (ref 4.2–5.8)
SODIUM SERPL-SCNC: 139 MMOL/L (ref 135–146)
T4 FREE SERPL-MCNC: 1.3 NG/DL (ref 0.8–1.8)
TRIGL SERPL-MCNC: 152 MG/DL
TSH SERPL-ACNC: 4.65 MIU/L (ref 0.4–4.5)
WBC # BLD AUTO: 8.7 THOUSAND/UL (ref 3.8–10.8)

## 2022-08-18 ENCOUNTER — TELEPHONE (OUTPATIENT)
Dept: NEUROLOGY | Facility: CLINIC | Age: 72
End: 2022-08-18

## 2022-08-18 NOTE — TELEPHONE ENCOUNTER
No answer from triage   1st attempt to schedule   No answer   LMOM   Schedule with General unless triage returned

## 2022-08-19 NOTE — TELEPHONE ENCOUNTER
Patient called back to schedule new patient appointment  Advised I can schedule with general neurology in [place] or [facility]  Patient said they don't want to travel that far and are going to look around to see if they can find a neurologist closer to them who can see for his diagnosis  Advised I will close the referral for now and we can reopen it if he changes his mind and calls back  Patient agreed

## 2022-08-23 ENCOUNTER — OFFICE VISIT (OUTPATIENT)
Dept: ENDOCRINOLOGY | Facility: HOSPITAL | Age: 72
End: 2022-08-23
Payer: COMMERCIAL

## 2022-08-23 VITALS
BODY MASS INDEX: 30.58 KG/M2 | DIASTOLIC BLOOD PRESSURE: 80 MMHG | HEIGHT: 72 IN | WEIGHT: 225.8 LBS | SYSTOLIC BLOOD PRESSURE: 142 MMHG | HEART RATE: 70 BPM

## 2022-08-23 DIAGNOSIS — R79.89 ELEVATED TSH: ICD-10-CM

## 2022-08-23 DIAGNOSIS — I10 PRIMARY HYPERTENSION: ICD-10-CM

## 2022-08-23 DIAGNOSIS — E11.42 DIABETIC POLYNEUROPATHY ASSOCIATED WITH TYPE 2 DIABETES MELLITUS (HCC): ICD-10-CM

## 2022-08-23 DIAGNOSIS — E11.65 TYPE 2 DIABETES MELLITUS WITH HYPERGLYCEMIA, WITHOUT LONG-TERM CURRENT USE OF INSULIN (HCC): Primary | ICD-10-CM

## 2022-08-23 DIAGNOSIS — E11.8 TYPE 2 DIABETES MELLITUS WITH COMPLICATION (HCC): ICD-10-CM

## 2022-08-23 DIAGNOSIS — E06.3 HASHIMOTO'S THYROIDITIS: ICD-10-CM

## 2022-08-23 PROCEDURE — 99215 OFFICE O/P EST HI 40 MIN: CPT | Performed by: INTERNAL MEDICINE

## 2022-08-23 RX ORDER — SITAGLIPTIN AND METFORMIN HYDROCHLORIDE 500; 50 MG/1; MG/1
TABLET, FILM COATED ORAL
Qty: 90 TABLET | Refills: 3 | Status: SHIPPED | OUTPATIENT
Start: 2022-08-23

## 2022-08-23 RX ORDER — SITAGLIPTIN AND METFORMIN HYDROCHLORIDE 1000; 50 MG/1; MG/1
TABLET, FILM COATED ORAL
Qty: 90 TABLET | Refills: 3 | Status: SHIPPED | OUTPATIENT
Start: 2022-08-23

## 2022-08-23 NOTE — PATIENT INSTRUCTIONS
Hgba1c is 7 8%  this is higher  Continue to work on the diet  Work on drinking more fluids/water  Continue the same jaunmet for now, do not skip any doses  Continue to test blood sugars once a day  Follow up in 6 months with blood work

## 2022-08-23 NOTE — PROGRESS NOTES
8/23/2022    Assessment/Plan      Diagnoses and all orders for this visit:    Type 2 diabetes mellitus with hyperglycemia, without long-term current use of insulin ([place] [address] )  -     HEMOGLOBIN A1C W/ EAG ESTIMATION Lab Collect; Future  -     Comprehensive metabolic panel Lab Collect; Future  -     CBC and differential Lab Collect; Future  -     TSH, 3rd generation Lab Collect; Future  -     T4, free Lab Collect; Future  -     sitaGLIPtin-metFORMIN (Janumet)  MG per tablet; Take 1 tablet daily after dinner( also take jaunmet 50/1000 mg tablet in am)    Diabetic polyneuropathy associated with type 2 diabetes mellitus ([place] [address] )  -     HEMOGLOBIN A1C W/ EAG ESTIMATION Lab Collect; Future  -     Comprehensive metabolic panel Lab Collect; Future  -     CBC and differential Lab Collect; Future  -     TSH, 3rd generation Lab Collect; Future  -     T4, free Lab Collect; Future    Hashimoto's thyroiditis  -     HEMOGLOBIN A1C W/ EAG ESTIMATION Lab Collect; Future  -     Comprehensive metabolic panel Lab Collect; Future  -     CBC and differential Lab Collect; Future  -     TSH, 3rd generation Lab Collect; Future  -     T4, free Lab Collect; Future    Primary hypertension  -     HEMOGLOBIN A1C W/ EAG ESTIMATION Lab Collect; Future  -     Comprehensive metabolic panel Lab Collect; Future  -     CBC and differential Lab Collect; Future  -     TSH, 3rd generation Lab Collect; Future  -     T4, free Lab Collect; Future    Elevated TSH  -     HEMOGLOBIN A1C W/ EAG ESTIMATION Lab Collect; Future  -     Comprehensive metabolic panel Lab Collect; Future  -     CBC and differential Lab Collect; Future  -     TSH, 3rd generation Lab Collect; Future  -     T4, free Lab Collect; Future    Type 2 diabetes mellitus with complication (HCC)  -     sitaGLIPtin-metFORMIN (Janumet)  MG per tablet; Take 1 tablet daily after breakfast( also takes janumet 50/500 mg at supper)  Assessment/Plan:  1  Type 2 diabetes    Hemoglobin A1c is 7 8%   This is higher than last visit and may likely be due to dietary changes  He will work on adjusting his diet as he has had a good hemoglobin A1c done in the past   He has been asked to increase his water intake  For now, he will continue the same Janumet and I have asked him to not skip any Janumet dosages  If his A1c does not improve, I can increase his Janumet to 50/1000 mg twice a day  Will continue to test his blood sugars up to once a day  2  Diabetic neuropathy  He denies neuropathic symptoms  He does see a podiatrist occasionally  Diabetic foot exam was last performed in February 2022  3  Hashimoto's thyroiditis with elevated TSH  Most recent TSH is just slightly above normal but stable for him  For now, we will continue to follow this over time as he is not been interested in treating his hypothyroidism  4  Hypertension  Blood pressure is slightly above normal on his current dose of amlodipine but this is not unreasonable  I have asked him to follow up in 6 months with preceding hemoglobin A1c, CMP, CBC, TSH, and free T4       CC: Diabetes type 2, thyroid, blood pressure follow-up    History of Present Illness     HPI: Mati Queen is a 81y o  year old male with type 2 diabetes with neuropathy for 22 years, hypertension, Hashimoto's thyroiditis with elevated TSH for follow-up visit  He is on oral agents at home and takes Janumet 50/1000 mg in a m  and Janumet 50/500 mg in p m  Venecia Carrillo maybe once a month may skip a pill  He denies any polyuria, polydipsia, polyphagia, and blurry vision  He will have nocturia occasionally when he wakes up several times a night  He denies numbness or tingling of the feet  He denies chest pain or shortness of breath  He denies nephropathy, retinopathy, heart attack, stroke and claudication but does admit to neuropathy  Hypoglycemic episodes: No rare      The patient's last eye exam was in december 2021 for cataract surgery and february 2022 for diabetic eye exam   The patient's last foot exam was in February 2022 at endocrine office visit  He does see a podiatrist with last visit December 2021  Last A1C was   Lab Results   Component Value Date    HGBA1C 7 8 (H) 07/28/2022     Blood Sugar/Glucometer/Pump/CGM review: does check blood sugars once daily, 2 hours after eating, upper 100s- 200  He has hypertension and takes amlodipine 5 mg twice a day  He denies headache or stroke-like symptoms  He has a history of Hashimoto's thyroiditis with a mildly elevated TSH that has fluctuated in the past   He is not interested in taking thyroid hormone as his TSH is only slightly elevated  Review of Systems   Constitutional: Negative for fatigue and unexpected weight change  Weight 5 lb less than last visit in February 2022  walks 1 hour a day  HENT: Negative for trouble swallowing  Eyes: Negative for visual disturbance  Has dry eyes  Respiratory: Negative for chest tightness and shortness of breath  Cardiovascular: Negative for chest pain  Gastrointestinal: Positive for abdominal distention  Negative for abdominal pain, constipation, diarrhea and nausea  Still bloated feeling  Endocrine: Negative for polydipsia, polyphagia and polyuria  Still not drinking more than 16 ounces a day  Nocturia after a few hours of laying down  Several times a night when gets up as unable to sleep  Skin: Negative for wound  Neurological: Positive for dizziness  Negative for weakness, light-headedness, numbness and headaches  Has dizziness that is worsening and daily  Happened several years ago and thought to be due to dehydration after negative MRI and blood work  Now to see neurology  To see PCP tomorrow  Psychiatric/Behavioral: Negative for sleep disturbance         Historical Information   Past Medical History:   Diagnosis Date    Fatty liver     Hiatal hernia      Past Surgical History:   Procedure Laterality Date    CATARACT EXTRACTION Right 11/2021    CATARACT EXTRACTION Left 2019     Social History   Social History     Substance and Sexual Activity   Alcohol Use Yes    Comment: social     Social History     Substance and Sexual Activity   Drug Use No     Social History     Tobacco Use   Smoking Status Never Smoker   Smokeless Tobacco Never Used     Family History:   Family History   Problem Relation Age of Onset    Hypertension Mother     Heart disease Father         lung collapsed, several heart attacks    Diabetes type II Sister     Hypertension Sister     Diabetes type II Sister     Hypertension Sister     Lucio's disease Sister     Hypertension Sister     Hypertension Sister     Hypertension Sister     Diabetes type II Brother     Heart disease Brother     Hypertension Brother        Meds/Allergies   Current Outpatient Medications   Medication Sig Dispense Refill    amLODIPine (NORVASC) 5 mg tablet Take 5 mg by mouth 2 (two) times a day       Ascorbic Acid (Vitamin C) 500 MG CAPS Take by mouth daily      Calcium Carb-Cholecalciferol (Caltrate 600+D3) 600-800 MG-UNIT TABS Take by mouth 2 (two) times a day      multivitamin-minerals (CENTRUM) tablet Take 1 tablet by mouth daily      Omega-3 Fatty Acids (FISH OIL) 1,000 mg Take 1,000 mg by mouth daily      OneTouch Ultra test strip USE TO CHECK BLOOD SUGAR ONCE DAILY 100 strip 1    sitaGLIPtin-metFORMIN (Janumet)  MG per tablet Take 1 tablet daily after breakfast( also takes janumet 50/500 mg at supper)  90 tablet 3    sitaGLIPtin-metFORMIN (Janumet)  MG per tablet Take 1 tablet daily after dinner( also take jaunmet 50/1000 mg tablet in am) 90 tablet 3    Specialty Vitamins Products (PROSTATE PO) Take by mouth 2 (two) times a day      vitamin E, tocopherol, 400 units capsule Take 400 Units by mouth daily       No current facility-administered medications for this visit       Allergies   Allergen Reactions    Actos [Pioglitazone] GI Intolerance     Significant GI upset       Objective   Vitals: Blood pressure 142/80, pulse 70, height 6' (1 829 m), weight 102 kg (225 lb 12 8 oz)  Invasive Devices  Report    None                 Physical Exam  Vitals reviewed  Constitutional:       Appearance: Normal appearance  He is well-developed  HENT:      Head: Normocephalic and atraumatic  Eyes:      Conjunctiva/sclera: Conjunctivae normal    Neck:      Thyroid: No thyromegaly  Vascular: No carotid bruit  Comments: Thyroid normal in size  Cardiovascular:      Rate and Rhythm: Normal rate and regular rhythm  Pulses: Normal pulses  Heart sounds: Normal heart sounds  No murmur heard  Pulmonary:      Effort: Pulmonary effort is normal       Breath sounds: Normal breath sounds  No wheezing  Abdominal:      Palpations: Abdomen is soft  Musculoskeletal:         General: No deformity  Normal range of motion  Cervical back: Normal range of motion and neck supple  Right lower leg: No edema  Left lower leg: No edema  Comments: No ulcerations of the feet   Lymphadenopathy:      Cervical: No cervical adenopathy  Skin:     General: Skin is warm and dry  Findings: No erythema or rash  Neurological:      Mental Status: He is alert and oriented to person, place, and time  Deep Tendon Reflexes: Reflexes are normal and symmetric  The history was obtained from the review of the chart and from the patient and wife      Lab Results:    Most recent Alc is  Lab Results   Component Value Date    HGBA1C 7 8 (H) 07/28/2022           Blood work done on 07/28/2022 showed a CMP with a glucose of 189 fasting but was otherwise normal     Lab Results   Component Value Date    CREATININE 0 80 07/28/2022    CREATININE 0 84 02/17/2022    CREATININE 0 77 08/14/2021    BUN 12 07/28/2022    K 4 2 07/28/2022     07/28/2022    CO2 25 07/28/2022     eGFR   Date Value Ref Range Status   07/28/2022 95 > OR = 60 mL/min/1 73m2 Final     Comment:     The eGFR is based on the CKD-EPI 2021 equation  To calculate   the new eGFR from a previous Creatinine or Cystatin C  result, go to [facility] at  [email or web address]         Total cholesterol 188, LDL cholesterol 118  Lab Results   Component Value Date    HDL 43 07/28/2022    TRIG 152 (H) 07/28/2022       Lab Results   Component Value Date    ALT 19 07/28/2022    AST 16 07/28/2022    ALKPHOS 60 07/28/2022       Lab Results   Component Value Date    TSH 4 65 (H) 07/28/2022    FREET4 1 3 07/28/2022     CBC is normal     Urine microalbumin to creatinine ratio is 3      Future Appointments   Date Time Provider Lloyd Gee   2/23/2023  1:20 PM Brad Russo MD [facility]

## 2022-10-07 DIAGNOSIS — E11.8 TYPE 2 DIABETES MELLITUS WITH COMPLICATION (HCC): ICD-10-CM

## 2022-10-07 RX ORDER — BLOOD SUGAR DIAGNOSTIC
STRIP MISCELLANEOUS
Qty: 100 STRIP | Refills: 1 | Status: SHIPPED | OUTPATIENT
Start: 2022-10-07

## 2023-02-21 LAB
ALBUMIN SERPL-MCNC: 4.4 G/DL (ref 3.6–5.1)
ALBUMIN/GLOB SERPL: 1.6 (CALC) (ref 1–2.5)
ALP SERPL-CCNC: 59 U/L (ref 35–144)
ALT SERPL-CCNC: 19 U/L (ref 9–46)
AST SERPL-CCNC: 14 U/L (ref 10–35)
BASOPHILS # BLD AUTO: 102 CELLS/UL (ref 0–200)
BASOPHILS NFR BLD AUTO: 1 %
BILIRUB SERPL-MCNC: 0.4 MG/DL (ref 0.2–1.2)
BUN SERPL-MCNC: 13 MG/DL (ref 7–25)
BUN/CREAT SERPL: ABNORMAL (CALC) (ref 6–22)
CALCIUM SERPL-MCNC: 9.6 MG/DL (ref 8.6–10.3)
CHLORIDE SERPL-SCNC: 104 MMOL/L (ref 98–110)
CO2 SERPL-SCNC: 25 MMOL/L (ref 20–32)
CREAT SERPL-MCNC: 0.83 MG/DL (ref 0.7–1.28)
EOSINOPHIL # BLD AUTO: 367 CELLS/UL (ref 15–500)
EOSINOPHIL NFR BLD AUTO: 3.6 %
ERYTHROCYTE [DISTWIDTH] IN BLOOD BY AUTOMATED COUNT: 12.7 % (ref 11–15)
EST. AVERAGE GLUCOSE BLD GHB EST-MCNC: 189 MG/DL
EST. AVERAGE GLUCOSE BLD GHB EST-SCNC: 10.4 MMOL/L
GFR/BSA.PRED SERPLBLD CYS-BASED-ARV: 94 ML/MIN/1.73M2
GLOBULIN SER CALC-MCNC: 2.7 G/DL (CALC) (ref 1.9–3.7)
GLUCOSE SERPL-MCNC: 181 MG/DL (ref 65–99)
HBA1C MFR BLD: 8.2 % OF TOTAL HGB
HCT VFR BLD AUTO: 42 % (ref 38.5–50)
HGB BLD-MCNC: 14 G/DL (ref 13.2–17.1)
LYMPHOCYTES # BLD AUTO: 2479 CELLS/UL (ref 850–3900)
LYMPHOCYTES NFR BLD AUTO: 24.3 %
MCH RBC QN AUTO: 28.3 PG (ref 27–33)
MCHC RBC AUTO-ENTMCNC: 33.3 G/DL (ref 32–36)
MCV RBC AUTO: 84.8 FL (ref 80–100)
MONOCYTES # BLD AUTO: 622 CELLS/UL (ref 200–950)
MONOCYTES NFR BLD AUTO: 6.1 %
NEUTROPHILS # BLD AUTO: 6630 CELLS/UL (ref 1500–7800)
NEUTROPHILS NFR BLD AUTO: 65 %
PLATELET # BLD AUTO: 293 THOUSAND/UL (ref 140–400)
PMV BLD REES-ECKER: 10.2 FL (ref 7.5–12.5)
POTASSIUM SERPL-SCNC: 4.5 MMOL/L (ref 3.5–5.3)
PROT SERPL-MCNC: 7.1 G/DL (ref 6.1–8.1)
RBC # BLD AUTO: 4.95 MILLION/UL (ref 4.2–5.8)
SODIUM SERPL-SCNC: 138 MMOL/L (ref 135–146)
T4 FREE SERPL-MCNC: 1.1 NG/DL (ref 0.8–1.8)
TSH SERPL-ACNC: 4.76 MIU/L (ref 0.4–4.5)
WBC # BLD AUTO: 10.2 THOUSAND/UL (ref 3.8–10.8)

## 2023-02-23 ENCOUNTER — OFFICE VISIT (OUTPATIENT)
Dept: ENDOCRINOLOGY | Facility: HOSPITAL | Age: 73
End: 2023-02-23

## 2023-02-23 VITALS
BODY MASS INDEX: 31.83 KG/M2 | HEIGHT: 72 IN | WEIGHT: 235 LBS | HEART RATE: 74 BPM | DIASTOLIC BLOOD PRESSURE: 70 MMHG | SYSTOLIC BLOOD PRESSURE: 138 MMHG

## 2023-02-23 DIAGNOSIS — R79.89 ELEVATED TSH: ICD-10-CM

## 2023-02-23 DIAGNOSIS — I10 PRIMARY HYPERTENSION: ICD-10-CM

## 2023-02-23 DIAGNOSIS — E11.65 TYPE 2 DIABETES MELLITUS WITH HYPERGLYCEMIA, WITHOUT LONG-TERM CURRENT USE OF INSULIN (HCC): Primary | ICD-10-CM

## 2023-02-23 DIAGNOSIS — E11.8 TYPE 2 DIABETES MELLITUS WITH COMPLICATION (HCC): ICD-10-CM

## 2023-02-23 DIAGNOSIS — E11.42 DIABETIC POLYNEUROPATHY ASSOCIATED WITH TYPE 2 DIABETES MELLITUS (HCC): ICD-10-CM

## 2023-02-23 DIAGNOSIS — E06.3 HASHIMOTO'S THYROIDITIS: ICD-10-CM

## 2023-02-23 RX ORDER — SITAGLIPTIN AND METFORMIN HYDROCHLORIDE 1000; 50 MG/1; MG/1
TABLET, FILM COATED ORAL
Qty: 180 TABLET | Refills: 3 | Status: SHIPPED | OUTPATIENT
Start: 2023-02-23

## 2023-02-23 NOTE — PROGRESS NOTES
2/24/2023    Assessment/Plan      Diagnoses and all orders for this visit:    Type 2 diabetes mellitus with hyperglycemia, without long-term current use of insulin ([facility] )  -     HEMOGLOBIN A1C W/ EAG ESTIMATION Lab Collect; Future  -     Comprehensive metabolic panel Lab Collect; Future  -     Lipid Panel with Direct LDL reflex Lab Collect; Future  -     Microalbumin / creatinine urine ratio Lab Collect; Future    Diabetic polyneuropathy associated with type 2 diabetes mellitus (HCC)  -     HEMOGLOBIN A1C W/ EAG ESTIMATION Lab Collect; Future  -     Comprehensive metabolic panel Lab Collect; Future  -     Lipid Panel with Direct LDL reflex Lab Collect; Future  -     Microalbumin / creatinine urine ratio Lab Collect; Future    Hashimoto's thyroiditis  -     HEMOGLOBIN A1C W/ EAG ESTIMATION Lab Collect; Future  -     Comprehensive metabolic panel Lab Collect; Future  -     Lipid Panel with Direct LDL reflex Lab Collect; Future  -     Microalbumin / creatinine urine ratio Lab Collect; Future    Elevated TSH  -     HEMOGLOBIN A1C W/ EAG ESTIMATION Lab Collect; Future  -     Comprehensive metabolic panel Lab Collect; Future  -     Lipid Panel with Direct LDL reflex Lab Collect; Future  -     Microalbumin / creatinine urine ratio Lab Collect; Future    Primary hypertension  -     HEMOGLOBIN A1C W/ EAG ESTIMATION Lab Collect; Future  -     Comprehensive metabolic panel Lab Collect; Future  -     Lipid Panel with Direct LDL reflex Lab Collect; Future  -     Microalbumin / creatinine urine ratio Lab Collect; Future    Type 2 diabetes mellitus with complication (HCC)  -     sitaGLIPtin-metFORMIN (Janumet)  MG per tablet; Take 1 tablet daily after breakfast and after supper        Assessment/Plan:  1  Type 2 diabetes  Hemoglobin A1c is gone up to 8 2% demonstrating poorly controlled diabetes  This is higher    He admits he has not been following his diet as well or walking or drinking water as much so he is going to work on drinking more water, walking and dietary changes  I will have him increase his Janumet 50/1000 mg twice daily  He will continue to test his blood sugars at least once a day and vary the times  2   Diabetic neuropathy  He denies neuropathic symptoms  Diabetic foot exam was performed in the office today  3   Hashimoto's thyroiditis with elevated TSH  TSH continues to fluctuate between normal and just above normal   He is not interested in thyroid hormone replacement and is not particularly symptomatic so this will be followed over time  4   Hypertension  He is normotensive in the office on his current dose of amlodipine  I have asked him to follow-up in 6 months with preceding hemoglobin A1c, CMP, lipid panel, and urine microalbumin to creatinine ratio  CC: Diabetes type II, thyroid, blood pressure follow-up    History of Present Illness     HPI: Matthias Tan is a 81y o  year old male with type 2 diabetes with neuropathy for 22 years, Hashimoto's thyroiditis with elevated TSH, hypertension for follow-up visit  He is on oral agents at home and takes Janumet 50/1000 mg in the morning and Janumet 50/500 mg in the evening  He denies any polyuria, polydipsia, polyphagia, and blurry vision  He has 4 times per night nocturia  He denies numbness or tingling of the feet  He denies chest pain or shortness of breath  He denies nephropathy, retinopathy, heart attack, stroke and claudication but does admit to neuropathy  He has been trying a lot with eating and walkng 1 hours daily  Hypoglycemic episodes: No rare  The patient's last eye exam was in February 2022  The patient's last foot exam was in February 2022 at endocrine office visit  Last A1C was   Lab Results   Component Value Date    HGBA1C 8 2 (H) 02/20/2023     Blood Sugar/Glucometer/Pump/CGM review: checks blood sugars at least once daily, usually 2 hours after breakfast  Blood sugars reported high 100s to low 200s  Sugars usually ok before eating  He has a history of Hashimoto's thyroiditis with a mildly elevated TSH that has fluctuated in the past   He is not interested in taking thyroid hormone as his TSH is only slightly elevated  He has hypertension and is currently taking amlodipine 5 mg twice a day  He denies headache or strokelike symptoms  Review of Systems   Constitutional: Negative for fatigue and unexpected weight change  Weight 10 pounds more than August 2022  HENT: Negative for trouble swallowing  Eyes: Negative for visual disturbance  Respiratory: Negative for chest tightness and shortness of breath  Cardiovascular: Negative for chest pain  Gastrointestinal: Positive for abdominal distention  Negative for abdominal pain, constipation, diarrhea and nausea  Still always feels bloated and gassy  Endocrine: Negative for polydipsia, polyphagia and polyuria  Nocturia 4 times a night  Skin: Negative for wound  Neurological: Positive for dizziness  Negative for weakness, light-headedness, numbness and headaches  Dizzy unchanged, thought to be due to ear issues  Psychiatric/Behavioral: Negative for sleep disturbance         Historical Information   Past Medical History:   Diagnosis Date   • Fatty liver    • Hiatal hernia      Past Surgical History:   Procedure Laterality Date   • CATARACT EXTRACTION Right 11/2021   • CATARACT EXTRACTION Left 2019     Social History   Social History     Substance and Sexual Activity   Alcohol Use Yes    Comment: social     Social History     Substance and Sexual Activity   Drug Use No     Social History     Tobacco Use   Smoking Status Never   Smokeless Tobacco Never     Family History:   Family History   Problem Relation Age of Onset   • Hypertension Mother    • Heart disease Father         lung collapsed, several heart attacks   • Diabetes type II Sister    • Hypertension Sister    • Diabetes type II Sister    • Hypertension Sister    • [place]'s disease Sister    • Hypertension Sister    • Hypertension Sister    • Hypertension Sister    • Diabetes type II Brother    • Heart disease Brother    • Hypertension Brother        Meds/Allergies   Current Outpatient Medications   Medication Sig Dispense Refill   • amLODIPine (NORVASC) 5 mg tablet Take 5 mg by mouth 2 (two) times a day      • Ascorbic Acid (Vitamin C) 500 MG CAPS Take by mouth daily     • Calcium Carb-Cholecalciferol (Caltrate 600+D3) 600-800 MG-UNIT TABS Take by mouth 2 (two) times a day     • multivitamin-minerals (CENTRUM) tablet Take 1 tablet by mouth daily     • Omega-3 Fatty Acids (FISH OIL) 1,000 mg Take 1,000 mg by mouth daily     • OneTouch Ultra test strip USE TO CHECK BLOOD SUGAR ONCE DAILY 100 strip 1   • sitaGLIPtin-metFORMIN (Janumet)  MG per tablet Take 1 tablet daily after breakfast and after supper 180 tablet 3   • Specialty Vitamins Products (PROSTATE PO) Take by mouth 2 (two) times a day     • vitamin E, tocopherol, 400 units capsule Take 400 Units by mouth daily       No current facility-administered medications for this visit  Allergies   Allergen Reactions   • Actos [Pioglitazone] GI Intolerance     Significant GI upset       Objective   Vitals: Blood pressure 138/70, pulse 74, height 6' (1 829 m), weight 107 kg (235 lb)  Invasive Devices     None                 Physical Exam  Vitals reviewed  Constitutional:       Appearance: Normal appearance  He is well-developed  HENT:      Head: Normocephalic and atraumatic  Eyes:      Conjunctiva/sclera: Conjunctivae normal    Neck:      Thyroid: No thyromegaly  Vascular: No carotid bruit  Comments: Thyroid normal in size  No palpable thyroid nodules  Cardiovascular:      Rate and Rhythm: Normal rate and regular rhythm  Pulses: Normal pulses  no weak pulses          Dorsalis pedis pulses are 2+ on the right side and 2+ on the left side          Posterior tibial pulses are 2+ on the right side and 2+ on the left side  Heart sounds: Normal heart sounds  No murmur heard  Pulmonary:      Effort: Pulmonary effort is normal       Breath sounds: Normal breath sounds  No wheezing  Abdominal:      Palpations: Abdomen is soft  Musculoskeletal:         General: No deformity  Cervical back: Normal range of motion and neck supple  Right lower leg: No edema  Left lower leg: No edema  Comments: No tremor of the outstretched hands  No ulcerations of the feet  Feet:      Right foot:      Skin integrity: No ulcer, skin breakdown, erythema, warmth, callus or dry skin  Left foot:      Skin integrity: No ulcer, skin breakdown, erythema, warmth, callus or dry skin  Lymphadenopathy:      Cervical: No cervical adenopathy  Skin:     General: Skin is warm and dry  Findings: No erythema or rash  Neurological:      Mental Status: He is alert and oriented to person, place, and time  Deep Tendon Reflexes: Reflexes are normal and symmetric  Comments: Vibration sensation diminished to the first toe DIP joint bilaterally  microfilament sensation intact bilaterally  achilles tendon reflexes intact  Patient's shoes and socks removed  Right Foot/Ankle   Right Foot Inspection  Skin Exam: skin normal and skin intact  No dry skin, no warmth, no callus, no erythema, no maceration, no abnormal color, no pre-ulcer, no ulcer and no callus  Toe Exam: No swelling and  no right toe deformity    Sensory   Vibration: diminished  Monofilament testing: intact    Vascular  Capillary refills: < 3 seconds  The right DP pulse is 2+  The right PT pulse is 2+  Left Foot/Ankle  Left Foot Inspection  Skin Exam: skin normal and skin intact  No dry skin, no warmth, no erythema, no maceration, normal color, no pre-ulcer, no ulcer and no callus  Toe Exam: No swelling and no left toe deformity       Sensory   Vibration: diminished  Monofilament testing: intact    Vascular  Capillary refills: < 3 seconds  The left DP pulse is 2+  The left PT pulse is 2+  Assign Risk Category  Loss of protective sensation  No weak pulses          The history was obtained from the review of the chart and from the patient and wife  Lab Results:    Most recent Alc is  Lab Results   Component Value Date    HGBA1C 8 2 (H) 02/20/2023           Blood work done on 2/20/2023 showed a CMP with a glucose of 181 random but was otherwise normal     CBC is normal     Lab Results   Component Value Date    CREATININE 0 83 02/20/2023    CREATININE 0 80 07/28/2022    CREATININE 0 84 02/17/2022    BUN 13 02/20/2023    K 4 5 02/20/2023     02/20/2023    CO2 25 02/20/2023     eGFR   Date Value Ref Range Status   02/20/2023 94 > OR = 60 mL/min/1 73m2 Final     Comment:     The eGFR is based on the CKD-EPI 2021 equation  To calculate   the new eGFR from a previous Creatinine or Cystatin C  result, go to [place] at  [email or web address]           Lab Results   Component Value Date    HDL 43 07/28/2022    TRIG 152 (H) 07/28/2022       Lab Results   Component Value Date    ALT 19 02/20/2023    AST 14 02/20/2023    ALKPHOS 59 02/20/2023       Lab Results   Component Value Date    TSH 4 76 (H) 02/20/2023    FREET4 1 1 02/20/2023             Future Appointments   Date Time Provider Lloyd Gee   8/23/2023  1:20 PM Do Muñoz MD [facility]

## 2023-02-23 NOTE — PATIENT INSTRUCTIONS
Hgba1c is 8 2%  this is higher  Let's increase the janumet 50/1000 mg twice a day  Stop the janumet 50/500 mg tablets  Continue to test blood sugars once daily, try to vary the times  Continue the walking  Continue to diet  Work on [employer]  Follow up in 6 months with blood work

## 2023-08-02 DIAGNOSIS — E11.8 TYPE 2 DIABETES MELLITUS WITH COMPLICATION (HCC): ICD-10-CM

## 2023-08-02 RX ORDER — BLOOD SUGAR DIAGNOSTIC
STRIP MISCELLANEOUS
Qty: 100 STRIP | Refills: 4 | Status: SHIPPED | OUTPATIENT
Start: 2023-08-02

## 2023-08-19 LAB
ALBUMIN SERPL-MCNC: 4.4 G/DL (ref 3.6–5.1)
ALBUMIN/CREAT UR: 3 MCG/MG CREAT
ALBUMIN/GLOB SERPL: 2 (CALC) (ref 1–2.5)
ALP SERPL-CCNC: 55 U/L (ref 35–144)
ALT SERPL-CCNC: 24 U/L (ref 9–46)
AST SERPL-CCNC: 19 U/L (ref 10–35)
BILIRUB SERPL-MCNC: 0.4 MG/DL (ref 0.2–1.2)
BUN SERPL-MCNC: 11 MG/DL (ref 7–25)
BUN/CREAT SERPL: ABNORMAL (CALC) (ref 6–22)
CALCIUM SERPL-MCNC: 9.5 MG/DL (ref 8.6–10.3)
CHLORIDE SERPL-SCNC: 105 MMOL/L (ref 98–110)
CHOLEST SERPL-MCNC: 174 MG/DL
CHOLEST/HDLC SERPL: 3.9 (CALC)
CO2 SERPL-SCNC: 26 MMOL/L (ref 20–32)
CREAT SERPL-MCNC: 0.81 MG/DL (ref 0.7–1.28)
CREAT UR-MCNC: 93 MG/DL (ref 20–320)
EST. AVERAGE GLUCOSE BLD GHB EST-MCNC: 171 MG/DL
EST. AVERAGE GLUCOSE BLD GHB EST-SCNC: 9.5 MMOL/L
GFR/BSA.PRED SERPLBLD CYS-BASED-ARV: 94 ML/MIN/1.73M2
GLOBULIN SER CALC-MCNC: 2.2 G/DL (CALC) (ref 1.9–3.7)
GLUCOSE SERPL-MCNC: 174 MG/DL (ref 65–99)
HBA1C MFR BLD: 7.6 % OF TOTAL HGB
HDLC SERPL-MCNC: 45 MG/DL
LDLC SERPL CALC-MCNC: 106 MG/DL (CALC)
MICROALBUMIN UR-MCNC: 0.3 MG/DL
NONHDLC SERPL-MCNC: 129 MG/DL (CALC)
POTASSIUM SERPL-SCNC: 4.3 MMOL/L (ref 3.5–5.3)
PROT SERPL-MCNC: 6.6 G/DL (ref 6.1–8.1)
SODIUM SERPL-SCNC: 138 MMOL/L (ref 135–146)
TRIGL SERPL-MCNC: 131 MG/DL

## 2023-08-23 ENCOUNTER — OFFICE VISIT (OUTPATIENT)
Dept: ENDOCRINOLOGY | Facility: HOSPITAL | Age: 73
End: 2023-08-23
Payer: COMMERCIAL

## 2023-08-23 VITALS
DIASTOLIC BLOOD PRESSURE: 68 MMHG | HEART RATE: 73 BPM | SYSTOLIC BLOOD PRESSURE: 140 MMHG | BODY MASS INDEX: 31.42 KG/M2 | HEIGHT: 72 IN | OXYGEN SATURATION: 96 % | WEIGHT: 232 LBS

## 2023-08-23 DIAGNOSIS — E11.42 DIABETIC POLYNEUROPATHY ASSOCIATED WITH TYPE 2 DIABETES MELLITUS (HCC): ICD-10-CM

## 2023-08-23 DIAGNOSIS — R79.89 ELEVATED TSH: ICD-10-CM

## 2023-08-23 DIAGNOSIS — E11.65 TYPE 2 DIABETES MELLITUS WITH HYPERGLYCEMIA, WITHOUT LONG-TERM CURRENT USE OF INSULIN (HCC): Primary | ICD-10-CM

## 2023-08-23 DIAGNOSIS — I10 PRIMARY HYPERTENSION: ICD-10-CM

## 2023-08-23 DIAGNOSIS — E06.3 HASHIMOTO'S THYROIDITIS: ICD-10-CM

## 2023-08-23 PROCEDURE — 99214 OFFICE O/P EST MOD 30 MIN: CPT | Performed by: INTERNAL MEDICINE

## 2023-08-23 NOTE — PATIENT INSTRUCTIONS
Hgba1c is 7.6%. this is improved. Continue the same janumet. Continue to work on the diet. Continue to test blood sugars once daily. Eye doctor visit. Follow up in 6 months with blood work.

## 2023-08-23 NOTE — PROGRESS NOTES
8/24/2023    Assessment/Plan      Diagnoses and all orders for this visit:    Type 2 diabetes mellitus with hyperglycemia, without long-term current use of insulin ([address])  -     HEMOGLOBIN A1C W/ EAG ESTIMATION Lab Collect; Future  -     Comprehensive metabolic panel Lab Collect; Future  -     TSH, 3rd generation Lab Collect; Future  -     T4, free Lab Collect; Future  -     CBC and differential Lab Collect; Future    Diabetic polyneuropathy associated with type 2 diabetes mellitus (HCC)  -     HEMOGLOBIN A1C W/ EAG ESTIMATION Lab Collect; Future  -     Comprehensive metabolic panel Lab Collect; Future  -     TSH, 3rd generation Lab Collect; Future  -     T4, free Lab Collect; Future  -     CBC and differential Lab Collect; Future    Hashimoto's thyroiditis  -     HEMOGLOBIN A1C W/ EAG ESTIMATION Lab Collect; Future  -     Comprehensive metabolic panel Lab Collect; Future  -     TSH, 3rd generation Lab Collect; Future  -     T4, free Lab Collect; Future  -     CBC and differential Lab Collect; Future    Elevated TSH  -     HEMOGLOBIN A1C W/ EAG ESTIMATION Lab Collect; Future  -     Comprehensive metabolic panel Lab Collect; Future  -     TSH, 3rd generation Lab Collect; Future  -     T4, free Lab Collect; Future  -     CBC and differential Lab Collect; Future    Primary hypertension  -     HEMOGLOBIN A1C W/ EAG ESTIMATION Lab Collect; Future  -     Comprehensive metabolic panel Lab Collect; Future  -     TSH, 3rd generation Lab Collect; Future  -     T4, free Lab Collect; Future  -     CBC and differential Lab Collect; Future        Assessment/Plan:  1. Type 2 diabetes. Hemoglobin A1c is 7.6%. This is improved. For now, he will continue to work on diet. He will continue the same Janumet. He will continue to test his blood sugars once a day. He is overdue for diabetic eye exam and I have asked him to make that appointment at his earliest convenience. 2.  Diabetic neuropathy. He denies neuropathic symptoms. Diabetic foot exams are up-to-date. 3.  Hashimoto's thyroiditis. Thyroid function studies have been followed over time. I will repeat his levels next visit. 4.  Hypertension. He is mildly hypertensive in the office on his current dose of amlodipine twice a day. He will not change the dosage for now. I have asked him to follow-up in 6 months with preceding hemoglobin A1c, CMP, TSH, free T4, and CBC. CC: Diabetes type II, thyroid, blood pressure follow-up    History of Present Illness     HPI: Nicolette Pierre is a 72y.o. year old male with type 2 diabetes with neuropathy for 23 years, Hashimoto's thyroiditis, hypertension for follow-up visit. He is on oral agents at home and takes Janumet 50/1000 mg twice daily. He started the higher dose after out of the lower dose in early June 2023. He denies any polyphagia, polydipsia, and blurry vision. He has polyuria and 3-4 times per night nocturia. He denies numbness or tingling of the feet. He denies chest pain or shortness of breath. He denies nephropathy, retinopathy, heart attack, stroke and claudication but does admit to neuropathy. Hypoglycemic episodes: No never. The patient's last eye exam was in February 2022. The patient's last foot exam was in February 2023 at endocrine office visit. He does not see podiatry. Last A1C was   Lab Results   Component Value Date    HGBA1C 7.6 (H) 08/18/2023   . Blood Sugar/Glucometer/Pump/CGM review: checks blood sugars once daily in am. Reports blood sugars 180-low 200s. Improved with the increase in meds. He has hypertension and takes amlodipine 5 mg twice a day. He denies headache or strokelike symptoms. He has a history of Hashimoto's thyroiditis with a mildly elevated TSH that has fluctuated in the past. Enedelia Lester is not interested in taking thyroid hormone as his TSH is only slightly elevated. Review of Systems   Constitutional: Negative for fatigue and unexpected weight change. Weight loss 3 lbs since last visit. HENT: Negative for trouble swallowing. Eyes: Negative for visual disturbance. Respiratory: Negative for chest tightness and shortness of breath. Cardiovascular: Negative for chest pain. Gastrointestinal: Negative for abdominal pain, constipation, diarrhea and nausea. Endocrine: Positive for polyuria. Negative for polydipsia and polyphagia. Nocturia 3-4 times a night. Skin: Negative for wound. Neurological: Positive for dizziness and light-headedness. Negative for weakness, numbness and headaches. Seeing neurology. To get Mri of the head. Getting dizziness. Seeing ENT. going to PT for this too. Psychiatric/Behavioral: Positive for sleep disturbance. Gets up to urinate a lot.         Historical Information   Past Medical History:   Diagnosis Date   • Fatty liver    • Hiatal hernia      Past Surgical History:   Procedure Laterality Date   • CATARACT EXTRACTION Right 11/2021   • CATARACT EXTRACTION Left 2019     Social History   Social History     Substance and Sexual Activity   Alcohol Use Yes    Comment: social     Social History     Substance and Sexual Activity   Drug Use No     Social History     Tobacco Use   Smoking Status Never   Smokeless Tobacco Never     Family History:   Family History   Problem Relation Age of Onset   • Hypertension Mother    • Heart disease Father         lung collapsed, several heart attacks   • Diabetes type II Sister    • Hypertension Sister    • Diabetes type II Sister    • Hypertension Sister    • [place]'s disease Sister    • Hypertension Sister    • Hypertension Sister    • Hypertension Sister    • Diabetes type II Brother    • Heart disease Brother    • Hypertension Brother        Meds/Allergies   Current Outpatient Medications   Medication Sig Dispense Refill   • amLODIPine (NORVASC) 5 mg tablet Take 5 mg by mouth 2 (two) times a day      • Ascorbic Acid (Vitamin C) 500 MG CAPS Take by mouth daily     • Calcium Carb-Cholecalciferol (Caltrate 600+D3) 600-800 MG-UNIT TABS Take by mouth 2 (two) times a day     • glucose blood (OneTouch Ultra) test strip Use to test blood sugars once a day 100 strip 4   • multivitamin-minerals (CENTRUM) tablet Take 1 tablet by mouth daily     • Omega-3 Fatty Acids (FISH OIL) 1,000 mg Take 1,000 mg by mouth daily     • sitaGLIPtin-metFORMIN (Janumet)  MG per tablet Take 1 tablet daily after breakfast and after supper 180 tablet 3   • vitamin E, tocopherol, 400 units capsule Take 400 Units by mouth daily     • Specialty Vitamins Products (PROSTATE PO) Take by mouth 2 (two) times a day (Patient not taking: Reported on 8/23/2023)       No current facility-administered medications for this visit. Allergies   Allergen Reactions   • Actos [Pioglitazone] GI Intolerance     Significant GI upset       Objective   Vitals: Blood pressure 140/68, pulse 73, height 6' (1.829 m), weight 105 kg (232 lb), SpO2 96 %. Invasive Devices     None                 Physical Exam  Vitals reviewed. Constitutional:       Appearance: Normal appearance. He is well-developed. HENT:      Head: Normocephalic and atraumatic. Eyes:      Conjunctiva/sclera: Conjunctivae normal.   Neck:      Thyroid: No thyromegaly. Vascular: No carotid bruit. Comments: Thyroid normal in size. Cardiovascular:      Rate and Rhythm: Normal rate and regular rhythm. Heart sounds: Normal heart sounds. No murmur heard. Pulmonary:      Effort: Pulmonary effort is normal.      Breath sounds: Normal breath sounds. No wheezing. Abdominal:      Palpations: Abdomen is soft. Musculoskeletal:         General: No deformity. Normal range of motion. Cervical back: Normal range of motion and neck supple. Right lower leg: No edema. Left lower leg: No edema. Lymphadenopathy:      Cervical: No cervical adenopathy. Skin:     General: Skin is warm and dry. Findings: No erythema or rash. Neurological:      Mental Status: He is alert and oriented to person, place, and time. Deep Tendon Reflexes: Reflexes are normal and symmetric. The history was obtained from the review of the chart and from the patient and wife. Lab Results:    Most recent Alc is  Lab Results   Component Value Date    HGBA1C 7.6 (H) 08/18/2023           Blood work performed on 8/18/2023 showed a CMP with a glucose of 174 fasting but was otherwise normal.    Urine microalbumin to creatinine ratio is 3. Lab Results   Component Value Date    CREATININE 0.81 08/18/2023    CREATININE 0.83 02/20/2023    CREATININE 0.80 07/28/2022    BUN 11 08/18/2023    K 4.3 08/18/2023     08/18/2023    CO2 26 08/18/2023     eGFR   Date Value Ref Range Status   08/18/2023 94 > OR = 60 mL/min/1.73m2 Final     Total cholesterol 174, LDL cholesterol 106.     Lab Results   Component Value Date    HDL 45 08/18/2023    TRIG 131 08/18/2023       Lab Results   Component Value Date    ALT 24 08/18/2023    AST 19 08/18/2023    ALKPHOS 55 08/18/2023       Lab Results   Component Value Date    TSH 4.76 (H) 02/20/2023    FREET4 1.1 02/20/2023             Future Appointments   Date Time Provider [address]   9/14/2023 11:30 AM Misa Drake MD [facility]  [facility]   9/14/2023 11:30 AM [facility]  [facility]   3/13/2024  1:20 PM Angelina Cotter MD [facility]

## 2023-11-27 LAB
LEFT EYE DIABETIC RETINOPATHY: POSITIVE
RIGHT EYE DIABETIC RETINOPATHY: NORMAL

## 2023-11-28 ENCOUNTER — APPOINTMENT (EMERGENCY)
Dept: CT IMAGING | Facility: HOSPITAL | Age: 73
End: 2023-11-28
Payer: COMMERCIAL

## 2023-11-28 ENCOUNTER — HOSPITAL ENCOUNTER (EMERGENCY)
Facility: HOSPITAL | Age: 73
Discharge: HOME | End: 2023-11-28
Attending: EMERGENCY MEDICINE
Payer: COMMERCIAL

## 2023-11-28 ENCOUNTER — APPOINTMENT (OUTPATIENT)
Dept: RADIOLOGY | Facility: HOSPITAL | Age: 73
End: 2023-11-28
Payer: COMMERCIAL

## 2023-11-28 VITALS
BODY MASS INDEX: 31.42 KG/M2 | OXYGEN SATURATION: 97 % | TEMPERATURE: 97.7 F | DIASTOLIC BLOOD PRESSURE: 90 MMHG | HEART RATE: 85 BPM | HEIGHT: 72 IN | SYSTOLIC BLOOD PRESSURE: 188 MMHG | WEIGHT: 232 LBS | RESPIRATION RATE: 18 BRPM

## 2023-11-28 DIAGNOSIS — S01.91XA LACERATION OF HEAD: Primary | ICD-10-CM

## 2023-11-28 LAB
ALBUMIN SERPL BCP-MCNC: 4.5 G/DL (ref 3.5–5)
ALP SERPL-CCNC: 45 U/L (ref 34–104)
ALT SERPL W P-5'-P-CCNC: 21 U/L (ref 7–52)
ANION GAP SERPL CALCULATED.3IONS-SCNC: 12 MMOL/L
AST SERPL W P-5'-P-CCNC: 19 U/L (ref 13–39)
BASOPHILS # BLD AUTO: 0.1 THOUSANDS/ÂΜL (ref 0–0.1)
BASOPHILS NFR BLD AUTO: 1 % (ref 0–1)
BILIRUB SERPL-MCNC: 0.39 MG/DL (ref 0.2–1)
BUN SERPL-MCNC: 16 MG/DL (ref 5–25)
CALCIUM SERPL-MCNC: 10 MG/DL (ref 8.4–10.2)
CARDIAC TROPONIN I PNL SERPL HS: 3 NG/L
CHLORIDE SERPL-SCNC: 102 MMOL/L (ref 96–108)
CO2 SERPL-SCNC: 22 MMOL/L (ref 21–32)
CREAT SERPL-MCNC: 0.89 MG/DL (ref 0.6–1.3)
EOSINOPHIL # BLD AUTO: 0.42 THOUSAND/ÂΜL (ref 0–0.61)
EOSINOPHIL NFR BLD AUTO: 4 % (ref 0–6)
ERYTHROCYTE [DISTWIDTH] IN BLOOD BY AUTOMATED COUNT: 13.3 % (ref 11.6–15.1)
GFR SERPL CREATININE-BSD FRML MDRD: 86 ML/MIN/1.73SQ M
GLUCOSE SERPL-MCNC: 164 MG/DL (ref 65–140)
HCT VFR BLD AUTO: 43.4 % (ref 36.5–49.3)
HGB BLD-MCNC: 14.2 G/DL (ref 12–17)
IMM GRANULOCYTES # BLD AUTO: 0.06 THOUSAND/UL (ref 0–0.2)
IMM GRANULOCYTES NFR BLD AUTO: 1 % (ref 0–2)
LYMPHOCYTES # BLD AUTO: 2.24 THOUSANDS/ÂΜL (ref 0.6–4.47)
LYMPHOCYTES NFR BLD AUTO: 22 % (ref 14–44)
MCH RBC QN AUTO: 28.5 PG (ref 26.8–34.3)
MCHC RBC AUTO-ENTMCNC: 32.7 G/DL (ref 31.4–37.4)
MCV RBC AUTO: 87 FL (ref 82–98)
MONOCYTES # BLD AUTO: 0.73 THOUSAND/ÂΜL (ref 0.17–1.22)
MONOCYTES NFR BLD AUTO: 7 % (ref 4–12)
NEUTROPHILS # BLD AUTO: 6.67 THOUSANDS/ÂΜL (ref 1.85–7.62)
NEUTS SEG NFR BLD AUTO: 65 % (ref 43–75)
NRBC BLD AUTO-RTO: 0 /100 WBCS
PLATELET # BLD AUTO: 252 THOUSANDS/UL (ref 149–390)
PMV BLD AUTO: 9.4 FL (ref 8.9–12.7)
POTASSIUM SERPL-SCNC: 4 MMOL/L (ref 3.5–5.3)
PROT SERPL-MCNC: 7.4 G/DL (ref 6.4–8.4)
RBC # BLD AUTO: 4.99 MILLION/UL (ref 3.88–5.62)
SODIUM SERPL-SCNC: 136 MMOL/L (ref 135–147)
WBC # BLD AUTO: 10.22 THOUSAND/UL (ref 4.31–10.16)

## 2023-11-28 PROCEDURE — 90715 TDAP VACCINE 7 YRS/> IM: CPT | Performed by: PHYSICIAN ASSISTANT

## 2023-11-28 PROCEDURE — 36415 COLL VENOUS BLD VENIPUNCTURE: CPT

## 2023-11-28 PROCEDURE — 99285 EMERGENCY DEPT VISIT HI MDM: CPT

## 2023-11-28 PROCEDURE — 84484 ASSAY OF TROPONIN QUANT: CPT

## 2023-11-28 PROCEDURE — 71046 X-RAY EXAM CHEST 2 VIEWS: CPT

## 2023-11-28 PROCEDURE — 90471 IMMUNIZATION ADMIN: CPT

## 2023-11-28 PROCEDURE — 70450 CT HEAD/BRAIN W/O DYE: CPT

## 2023-11-28 PROCEDURE — 99284 EMERGENCY DEPT VISIT MOD MDM: CPT | Performed by: PHYSICIAN ASSISTANT

## 2023-11-28 PROCEDURE — 85025 COMPLETE CBC W/AUTO DIFF WBC: CPT

## 2023-11-28 PROCEDURE — 93005 ELECTROCARDIOGRAM TRACING: CPT

## 2023-11-28 PROCEDURE — 80053 COMPREHEN METABOLIC PANEL: CPT

## 2023-11-28 PROCEDURE — 12052 INTMD RPR FACE/MM 2.6-5.0 CM: CPT | Performed by: PHYSICIAN ASSISTANT

## 2023-11-28 RX ORDER — IBUPROFEN 600 MG/1
600 TABLET ORAL EVERY 6 HOURS PRN
Qty: 30 TABLET | Refills: 0 | Status: SHIPPED | OUTPATIENT
Start: 2023-11-28 | End: 2023-11-28 | Stop reason: SDUPTHER

## 2023-11-28 RX ORDER — ACETAMINOPHEN 325 MG/1
650 TABLET ORAL ONCE
Status: COMPLETED | OUTPATIENT
Start: 2023-11-28 | End: 2023-11-28

## 2023-11-28 RX ORDER — IBUPROFEN 600 MG/1
600 TABLET ORAL EVERY 6 HOURS PRN
Qty: 30 TABLET | Refills: 0 | Status: SHIPPED | OUTPATIENT
Start: 2023-11-28

## 2023-11-28 RX ORDER — GINSENG 100 MG
1 CAPSULE ORAL ONCE
Status: COMPLETED | OUTPATIENT
Start: 2023-11-28 | End: 2023-11-28

## 2023-11-28 RX ORDER — GINSENG 100 MG
1 CAPSULE ORAL 2 TIMES DAILY
Qty: 14 G | Refills: 0 | Status: SHIPPED | OUTPATIENT
Start: 2023-11-28 | End: 2023-11-28 | Stop reason: SDUPTHER

## 2023-11-28 RX ORDER — LIDOCAINE HYDROCHLORIDE 10 MG/ML
10 INJECTION, SOLUTION EPIDURAL; INFILTRATION; INTRACAUDAL; PERINEURAL ONCE
Status: COMPLETED | OUTPATIENT
Start: 2023-11-28 | End: 2023-11-28

## 2023-11-28 RX ORDER — GINSENG 100 MG
1 CAPSULE ORAL 2 TIMES DAILY
Qty: 14 G | Refills: 0 | Status: SHIPPED | OUTPATIENT
Start: 2023-11-28 | End: 2023-12-05

## 2023-11-28 RX ADMIN — TETANUS TOXOID, REDUCED DIPHTHERIA TOXOID AND ACELLULAR PERTUSSIS VACCINE, ADSORBED 0.5 ML: 5; 2.5; 8; 8; 2.5 SUSPENSION INTRAMUSCULAR at 18:37

## 2023-11-28 RX ADMIN — BACITRACIN 1 SMALL APPLICATION: 500 OINTMENT TOPICAL at 18:37

## 2023-11-28 RX ADMIN — LIDOCAINE HYDROCHLORIDE 10 ML: 10 INJECTION, SOLUTION EPIDURAL; INFILTRATION; INTRACAUDAL; PERINEURAL at 18:37

## 2023-11-28 RX ADMIN — ACETAMINOPHEN 650 MG: 325 TABLET, FILM COATED ORAL at 19:54

## 2023-11-29 LAB
ATRIAL RATE: 84 BPM
P AXIS: 71 DEGREES
PR INTERVAL: 158 MS
QRS AXIS: 19 DEGREES
QRSD INTERVAL: 82 MS
QT INTERVAL: 364 MS
QTC INTERVAL: 430 MS
T WAVE AXIS: 10 DEGREES
VENTRICULAR RATE: 84 BPM

## 2023-11-30 NOTE — ED PROVIDER NOTES
History  Chief Complaint   Patient presents with    Fall     Pt report feeling dizzy and hit his face on the fence rail @1630. Denies loc, head strike on the left side of head. Denies thinners. Patient is a 21-year-old male with past medical history significant for hypertension, NIDDM 2, fatty liver, who has been dealing with vertigo for the past several months who presents for evaluation of fall and head strike. Patient states he was bending over to get something when he was overcome with dizziness from his vertigo. He fell forward striking his head on the pavement. He denies loss of consciousness or any other injuries. He specifically denies shortness of breath, neck pain, bloody nose, loss of consciousness, blindness or visual changes, nausea or vomiting, seizure, or changes in hearing. Fall  Associated symptoms: no abdominal pain, no back pain, no chest pain, no seizures and no vomiting        Prior to Admission Medications   Prescriptions Last Dose Informant Patient Reported? Taking?    Ascorbic Acid (Vitamin C) 500 MG CAPS  Self Yes No   Sig: Take by mouth daily   Calcium Carb-Cholecalciferol (Caltrate 600+D3) 600-800 MG-UNIT TABS  Self Yes No   Sig: Take by mouth 2 (two) times a day   Omega-3 Fatty Acids (FISH OIL) 1,000 mg  Self Yes No   Sig: Take 1,000 mg by mouth daily   Specialty Vitamins Products (PROSTATE PO)  Self Yes No   Sig: Take by mouth 2 (two) times a day   Patient not taking: Reported on 8/23/2023   amLODIPine (NORVASC) 5 mg tablet  Self Yes No   Sig: Take 5 mg by mouth 2 (two) times a day    glucose blood (OneTouch Ultra) test strip  Self No No   Sig: Use to test blood sugars once a day   multivitamin-minerals (CENTRUM) tablet  Self Yes No   Sig: Take 1 tablet by mouth daily   sitaGLIPtin-metFORMIN (Janumet)  MG per tablet  Self No No   Sig: Take 1 tablet daily after breakfast and after supper   vitamin E, tocopherol, 400 units capsule  Self Yes No   Sig: Take 400 Units by mouth daily      Facility-Administered Medications: None       Past Medical History:   Diagnosis Date    Diabetes mellitus ([address])     Fatty liver     Hiatal hernia     Hypertension        Past Surgical History:   Procedure Laterality Date    CATARACT EXTRACTION Right 11/2021    CATARACT EXTRACTION Left 2019       Family History   Problem Relation Age of Onset    Hypertension Mother     Heart disease Father         lung collapsed, several heart attacks    Diabetes type II Sister     Hypertension Sister     Diabetes type II Sister     Hypertension Sister     Coweta's disease Sister     Hypertension Sister     Hypertension Sister     Hypertension Sister     Diabetes type II Brother     Heart disease Brother     Hypertension Brother      I have reviewed and agree with the history as documented. E-Cigarette/Vaping    E-Cigarette Use Never User      E-Cigarette/Vaping Substances     Social History     Tobacco Use    Smoking status: Never    Smokeless tobacco: Never   Vaping Use    Vaping Use: Never used   Substance Use Topics    Alcohol use: Yes     Comment: social    Drug use: No       Review of Systems   Constitutional: Negative. Negative for chills and fever. HENT: Negative. Negative for ear pain and sore throat. Eyes: Negative. Negative for pain and visual disturbance. Respiratory: Negative. Negative for cough and shortness of breath. Cardiovascular: Negative. Negative for chest pain and palpitations. Gastrointestinal: Negative. Negative for abdominal pain and vomiting. Endocrine: Negative. Genitourinary: Negative. Negative for dysuria and hematuria. Musculoskeletal:  Negative for arthralgias and back pain. Skin:  Positive for color change and wound. Negative for rash. Allergic/Immunologic: Negative. Neurological: Negative. Negative for seizures and syncope. Hematological: Negative. Psychiatric/Behavioral: Negative.      All other systems reviewed and are negative. Physical Exam  Physical Exam  Vitals and nursing note reviewed. Constitutional:       General: He is not in acute distress. Appearance: He is well-developed. HENT:      Head: Normocephalic and atraumatic. Right Ear: Ear canal and external ear normal.      Left Ear: Ear canal and external ear normal.      Ears:      Comments: Unable to visualize tympanic membrane secondary to cerumen impaction    Cerumen attempted to be removed with 50 cc syringe, warm water and peroxide without improvement     Nose: Nose normal.      Mouth/Throat:      Mouth: Mucous membranes are moist.   Eyes:      General: No scleral icterus. Conjunctiva/sclera: Conjunctivae normal.      Pupils: Pupils are equal, round, and reactive to light. Cardiovascular:      Rate and Rhythm: Normal rate and regular rhythm. Pulses: Normal pulses. Heart sounds: Normal heart sounds. No murmur heard. Pulmonary:      Effort: Pulmonary effort is normal. No respiratory distress. Breath sounds: Normal breath sounds. Abdominal:      General: Abdomen is flat. Palpations: Abdomen is soft. Tenderness: There is no abdominal tenderness. Musculoskeletal:         General: No swelling. Cervical back: Normal range of motion and neck supple. No rigidity or tenderness. Skin:     General: Skin is warm and dry. Capillary Refill: Capillary refill takes less than 2 seconds. Neurological:      General: No focal deficit present. Mental Status: He is alert and oriented to person, place, and time. Cranial Nerves: No cranial nerve deficit. Sensory: No sensory deficit. Motor: No weakness.       Coordination: Coordination normal.      Gait: Gait normal.      Deep Tendon Reflexes: Reflexes normal.   Psychiatric:         Mood and Affect: Mood normal.         Behavior: Behavior normal.         Vital Signs  ED Triage Vitals [11/28/23 1754]   Temperature Pulse Respirations Blood Pressure SpO2 97.7 °F (36.5 °C) 85 18 (!) 188/90 97 %      Temp Source Heart Rate Source Patient Position - Orthostatic VS BP Location FiO2 (%)   Oral Monitor Sitting Left arm --      Pain Score       6           Vitals:    11/28/23 1754   BP: (!) 188/90   Pulse: 85   Patient Position - Orthostatic VS: Sitting         Visual Acuity      ED Medications  Medications   bacitracin topical ointment 1 small application (1 small application Topical Given by Other 11/28/23 1837)   lidocaine (PF) (XYLOCAINE-MPF) 1 % injection 10 mL (10 mL Infiltration Given by Other 11/28/23 1837)   tetanus-diphtheria-acellular pertussis (BOOSTRIX) IM injection 0.5 mL (0.5 mL Intramuscular Given 11/28/23 1837)   acetaminophen (TYLENOL) tablet 650 mg (650 mg Oral Given 11/28/23 1954)       Diagnostic Studies  Results Reviewed       Procedure Component Value Units Date/Time    HS Troponin 0hr (reflex protocol) [[id number]]  (Normal) Collected: 11/28/23 1802    Lab Status: Final result Specimen: Blood from Arm, Right Updated: 11/28/23 1918     hs TnI 0hr 3 ng/L     Comprehensive metabolic panel [[id number]]  (Abnormal) Collected: 11/28/23 1802    Lab Status: Final result Specimen: Blood from Arm, Right Updated: 11/28/23 1911     Sodium 136 mmol/L      Potassium 4.0 mmol/L      Chloride 102 mmol/L      CO2 22 mmol/L      ANION GAP 12 mmol/L      BUN 16 mg/dL      Creatinine 0.89 mg/dL      Glucose 164 mg/dL      Calcium 10.0 mg/dL      AST 19 U/L      ALT 21 U/L      Alkaline Phosphatase 45 U/L      Total Protein 7.4 g/dL      Albumin 4.5 g/dL      Total Bilirubin 0.39 mg/dL      eGFR 86 ml/min/1.73sq m     Narrative:      [place] guidelines for Chronic Kidney Disease (CKD):     Stage 1 with normal or high GFR (GFR > 90 mL/min/1.73 square meters)    Stage 2 Mild CKD (GFR = 60-89 mL/min/1.73 square meters)    Stage 3A Moderate CKD (GFR = 45-59 mL/min/1.73 square meters)    Stage 3B Moderate CKD (GFR = 30-44 mL/min/1.73 square meters)    Stage 4 Severe CKD (GFR = 15-29 mL/min/1.73 square meters)    Stage 5 End Stage CKD (GFR <15 mL/min/1.73 square meters)  Note: GFR calculation is accurate only with a steady state creatinine    CBC and differential [[id number]]  (Abnormal) Collected: 11/28/23 1802    Lab Status: Final result Specimen: Blood from Arm, Right Updated: 11/28/23 1823     WBC 10.22 Thousand/uL      RBC 4.99 Million/uL      Hemoglobin 14.2 g/dL      Hematocrit 43.4 %      MCV 87 fL      MCH 28.5 pg      MCHC 32.7 g/dL      RDW 13.3 %      MPV 9.4 fL      Platelets 204 Thousands/uL      nRBC 0 /100 WBCs      Neutrophils Relative 65 %      Immat GRANS % 1 %      Lymphocytes Relative 22 %      Monocytes Relative 7 %      Eosinophils Relative 4 %      Basophils Relative 1 %      Neutrophils Absolute 6.67 Thousands/µL      Immature Grans Absolute 0.06 Thousand/uL      Lymphocytes Absolute 2.24 Thousands/µL      Monocytes Absolute 0.73 Thousand/µL      Eosinophils Absolute 0.42 Thousand/µL      Basophils Absolute 0.10 Thousands/µL                    CT head without contrast   Final Result by Sang Brennan MD (11/28 2002)      No intracranial hemorrhage or calvarial fracture. Minimal left supraorbital soft tissue swelling with thin hematoma. Workstation performed: [id number]         XR chest pa & lateral   Final Result by Billy Delgado MD (11/29 2464)      No acute cardiopulmonary disease. Resident: Silvestre Gayle, the attending radiologist, have reviewed the images and agree with the final report above.       Workstation performed: [id number]                    Procedures  ECG 12 Lead Documentation Only    Date/Time: 11/28/2023 6:02 PM    Performed by: Vashti Fernández PA-C  Authorized by: Vashti Fernández PA-C    ECG reviewed by me, the ED Provider: yes    Patient location:  ED  Rate:     ECG rate:  84    ECG rate assessment: normal    Rhythm:     Rhythm: sinus rhythm    Ectopy:     Ectopy: none    T waves:     T waves: non-specific    Universal Protocol:  Consent: Verbal consent obtained. Risks and benefits: risks, benefits and alternatives were discussed  Consent given by: patient  Time out: Immediately prior to procedure a "time out" was called to verify the correct patient, procedure, equipment, support staff and site/side marked as required. Patient identity confirmed: verbally with patient  Laceration repair    Date/Time: 11/28/2023 7:00 PM    Performed by: Anisa Mcnamara PA-C  Authorized by: Anisa Mcnamara PA-C  Body area: head/neck  Location details: forehead  Laceration length: 4 cm  Foreign bodies: no foreign bodies  Tendon involvement: none  Nerve involvement: none  Vascular damage: no  Anesthesia: local infiltration    Anesthesia:  Local Anesthetic: lidocaine 1% with epinephrine  Anesthetic total: 10 mL    Sedation:  Patient sedated: no      Wound Dehiscence:  Superficial Wound Dehiscence: simple closure      Procedure Details:  Preparation: Patient was prepped and draped in the usual sterile fashion. Irrigation solution: saline  Irrigation method: syringe  Amount of cleaning: standard  Degree of undermining: minimal  Skin closure: 4-0 nylon  Subcutaneous closure: 5-0 Vicryl (3 subcutaneous sutures to approximate surface)  Number of sutures: 7  Technique: simple  Approximation: close  Approximation difficulty: simple  Dressing: 4x4 sterile gauze  Patient tolerance: patient tolerated the procedure well with no immediate complications               ED Course                               SBIRT 22yo+      Flowsheet Row Most Recent Value   Initial Alcohol Screen: US AUDIT-C     1. How often do you have a drink containing alcohol? 0 Filed at: 11/28/2023 1756   2. How many drinks containing alcohol do you have on a typical day you are drinking? 0 Filed at: 11/28/2023 1756   3a. Male UNDER 65:  How often do you have five or more drinks on one occasion? 0 Filed at: 11/28/2023 1756   3b. FEMALE Any Age, or MALE 65+: How often do you have 4 or more drinks on one occassion? 0 Filed at: 11/28/2023 1756   Audit-C Score 0 Filed at: 11/28/2023 1756   CON: How many times in the past year have you. .. Used an illegal drug or used a prescription medication for non-medical reasons? Never Filed at: 11/28/2023 1756                      Medical Decision Making  Problems Addressed:  Laceration of head: acute illness or injury     Details: Head laceration sustained when patient had episode of vertigo  Dizziness is not new and is not worse patient is being seen by ENT next week  Patient sustained laceration which was repaired-wound did require undermining to approximate surface of wound which was then closed with simple interrupted silk sutures  CT head negative for acute pathology    Amount and/or Complexity of Data Reviewed  Radiology: ordered. Risk  OTC drugs. Prescription drug management. Disposition  Final diagnoses:   Laceration of head     Time reflects when diagnosis was documented in both MDM as applicable and the Disposition within this note       Time User Action Codes Description Comment    11/28/2023  8:26 PM [place], [address] [facility] Laceration of head           ED Disposition       ED Disposition   Discharge    Condition   Stable    Date/Time   Tue Nov 28, 2023 2026    Comment   Sarajane Koyanagi discharge to home/self care.                    Follow-up Information       Follow up With Specialties Details Why Contact Info Additional Information    Michelle Villegas MD Family Medicine Go to  As needed [address] Emergency Department Emergency Medicine Go to  If symptoms worsen [address] [facility] [id number]  [address] [facility] Emergency Department, [address] [facility], [place], [address] Discharge Medication List as of 11/28/2023  8:40 PM        CONTINUE these medications which have CHANGED    Details   bacitracin topical ointment 500 units/g topical ointment Apply 1 large application topically 2 (two) times a day for 7 days, Starting Tue 11/28/2023, Until Tue 12/5/2023, Normal      ibuprofen (MOTRIN) 600 mg tablet Take 1 tablet (600 mg total) by mouth every 6 (six) hours as needed for mild pain, Starting Tue 11/28/2023, Normal           CONTINUE these medications which have NOT CHANGED    Details   amLODIPine (NORVASC) 5 mg tablet Take 5 mg by mouth 2 (two) times a day , Historical Med      Ascorbic Acid (Vitamin C) 500 MG CAPS Take by mouth daily, Historical Med      Calcium Carb-Cholecalciferol (Caltrate 600+D3) 600-800 MG-UNIT TABS Take by mouth 2 (two) times a day, Historical Med      glucose blood (OneTouch Ultra) test strip Use to test blood sugars once a day, Normal      multivitamin-minerals (CENTRUM) tablet Take 1 tablet by mouth daily, Historical Med      Omega-3 Fatty Acids (FISH OIL) 1,000 mg Take 1,000 mg by mouth daily, Historical Med      sitaGLIPtin-metFORMIN (Janumet)  MG per tablet Take 1 tablet daily after breakfast and after supper, Normal      Specialty Vitamins Products (PROSTATE PO) Take by mouth 2 (two) times a day, Historical Med      vitamin E, tocopherol, 400 units capsule Take 400 Units by mouth daily, Historical Med             No discharge procedures on file.     PDMP Review       None            ED Provider  Electronically Signed by             Michele Mcdaniels PA-C  11/30/23 6186

## 2024-01-24 ENCOUNTER — EVALUATION (OUTPATIENT)
Facility: CLINIC | Age: 74
End: 2024-01-24
Payer: COMMERCIAL

## 2024-01-24 DIAGNOSIS — R42 LIGHTHEADEDNESS: ICD-10-CM

## 2024-01-24 DIAGNOSIS — R26.81 UNSTEADINESS ON FEET: ICD-10-CM

## 2024-01-24 DIAGNOSIS — Z91.81 PERSONAL HISTORY OF FALL: ICD-10-CM

## 2024-01-24 DIAGNOSIS — H81.10 BPPV (BENIGN PAROXYSMAL POSITIONAL VERTIGO), UNSPECIFIED LATERALITY: Primary | ICD-10-CM

## 2024-01-24 PROBLEM — R68.81 EARLY SATIETY: Status: ACTIVE | Noted: 2024-01-24

## 2024-01-24 PROBLEM — R39.15 URINARY URGENCY: Status: ACTIVE | Noted: 2024-01-24

## 2024-01-24 PROCEDURE — 97161 PT EVAL LOW COMPLEX 20 MIN: CPT

## 2024-01-24 PROCEDURE — 97112 NEUROMUSCULAR REEDUCATION: CPT

## 2024-01-24 NOTE — PROGRESS NOTES
PT Evaluation     Today's date: 2024  Patient name: Edmund Larose  : 1950  MRN: [id number]  Referring provider: Oswald Davidson MD  Dx:   Encounter Diagnosis     ICD-10-CM    1. BPPV (benign paroxysmal positional vertigo), unspecified laterality  H81.10 Ambulatory referral to Physical Therapy      2. Lightheadedness  R42       3. Personal history of fall  Z91.81       4. Unsteadiness on feet  R26.81                      Assessment  Assessment details: Pt is a 73 y.o. male presenting to physical therapy with chief complaint of  constant lightheadedness that is sometimes exacerbated with bed mobility and positional changes from supine to sit and sit to stand. Today's assessment reflects positive L lance hallpike as indicated by left torsional, upbeating nystagmus lasting <15s, which is consistent with dx of left posterior canal BPPV. Canalith repositioning maneuver was was performed 1x with no reported change in pt's symptoms, would benefit from second person present for maneuver due to his mobility limitations. Pt noted mild cervical discomfort in each position due to history of cervical pain. Cervical screening for red flags was unremarkable. Pt was assessed for orthostatic hypotension and pt exhibited a 19mmHg drop in systolic BP and 19mmHg drop in diastolic BP when transitioning from sit to stand, reflecting presence of orthostatic hypotension. Pt was educated on importance of performing activities such as ankle pumps, glute squeezes, or other muscle contraction activities to activate the muscle pump and aid in venous return prior to position changes, pt expressed understanding. Pt also reports history of multiple falls and would benefit from balance intervention to prevent future falls. Systemically, pt expressed concern surrounding his early satiety, so plan to reach out to physician to express these concerns to them. Pt noted increased cervical discomfort at the conclusion of session, instructed pt  utilize ice or heat to reduce p!. Pt will benefit from skilled physical therapy intervention addressing dysfunctions of gait, balance, and mobility so that they may be able to perform self-care tasks and ambulate safely.      Impairments: abnormal gait, abnormal movement, difficulty understanding, impaired balance and safety issue  Functional limitations: gait, stair navigation, rolling  Symptom irritability: lowUnderstanding of Dx/Px/POC: good   Prognosis: good    Goals  STG's: To be achieved within 4 weeks  -Pt will exhibit independence with HEP within 2 weeks.   -Pt will maintain dizziness rating <3/10 for 7 consecutive days.  -FGA to be performed at future visits to assess instability with gait.     LTG's: TBA within 8 weeks  -pt will be able to ambulate for 40ft with horizontal head turns without LOB so that they may be able to grocery shop without limitation within 6 weeks  -pt will report 0/10 instances of vertigo with rolling when getting of bed to improve safety with bed mobility within 4 weeks  -Pt will exhibit negative L lance hallpike to reflect resolution of BPPV  -Pt will be able to verbalize the implementation of management strategies for orthostatic hypotension independently.    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  TE--to improve strength, mobility, and flexibility  NR--to improve balance, coordination, and stability  TA--to improve functional activities  MT--to improve joint mobility and soft tissue restriction  Gait Training--to improve gait  Patient would benefit from: skilled physical therapy  Planned modality interventions: electrical stimulation/Russian stimulation, thermotherapy: hydrocollator packs, biofeedback and cryotherapy  Planned therapy interventions: balance, ADL training, abdominal trunk stabilization, activity modification, balance/weight bearing training, behavior modification, body mechanics training, breathing training, canalith  "repositioning, neuromuscular re-education, patient education, therapeutic activities, therapeutic exercise, strengthening, sensory integrative techniques, gait training and home exercise program  Frequency: 2x week  Duration in visits: 16  Duration in weeks: 8  Plan of Care beginning date: 1/24/2024  Plan of Care expiration date: 3/20/2024  Treatment plan discussed with: patient        Subjective Evaluation    History of Present Illness  Date of onset: 11/28/2023  Mechanism of injury: Pt states that he is hear for PT because \"my ear doctor told me to get checked out for the crystals.\" Pt has had episodic dizziness spanning >20 years. He had two \"attacks\" or room spinning dizziness where he had to go the ER. Since then, it has been intermittent lightheadedness. It is never room spinning at this time. With this episode, pt became very lightheaded when bending forward to perform a task, and he fell forwards into a split rail fence. He got stitches on his head. He did not sustain a concussion and all imaging was unremarkable. Pt states that his lightheadedness has improved since he hit his head, however, he is having some left neck/upper shoulder discomfort that worsens with UE movement. He states that he was supposed to get PT for that, but \"I\"m ignoring it and it is feeling better,\" so he is not going to pursue PT for it at this time. He notes that his neck pain worsens with sidebending his neck L & R. Regarding the quality of his dizziness, pt notes it is exclusively a lightheadedness. He does not report any nauesa or GI upset, and reports no syncopal history. He has had previous vestibular therapy at a facility in [place], and it helped a little, but did not fix his issues. Pt states that his neurologist wanted the PT's at his clinic to perform a test with a machine, but the pt is not sure what it was and does not think it was performed. His dizziness ranges from 1-4/10. Other exacerbating factors include " "transitioning from laying down to standing up & getting into or out of bed. He notes that he has had a few falls, usually when bending forward to work on heaters. He has some mild concern about his balance. His goals are to reduce his dizziness & understand what's causing it, although he is not optimistic that that will be possible.     Systemically, pt notes that he also experiences early satiety, and significant GI distress. He notes that he also experiences increased urinary urgency that is due to something bladder or prostate related, but he isn't sure which.           Recurrent probem    Patient Goals  Patient goal: understand his dizziness & resolve it  Pain  Current pain ratin  At best pain ratin  At worst pain ratin  Location: left side of his neck. \"Feels like a muscle has been stretched or jammed.\"  Quality: throbbing, pulling and tight  Aggravating factors: nothing  Progression: improved    Social Support  Steps to enter house: yes  13  Stairs in house: yes   2  Lives in: multiple-level home  Lives with: spouse    Employment status: not working  Hand dominance: right    Treatments  Previous treatment: physical therapy      Objective     Concurrent Complaints  Positive for tinnitus and hearing loss. Negative for headaches, nausea/motion sickness, visual change, memory loss, aural fullness and poor concentration    Static Posture     Head  Forward.    Shoulders  Rounded.  Neuro Exam:     Dizziness  Positive for vertigo and light-headedness.   Negative for disequilibrium, oscillopsia, motion sickness, rocking or swaying, diplopia and floating or swimming.     Exacerbating factors  Positive for bending over, rolling in bed and supine to/from sitting.   Negative for looking up, walking, turning head, optokinetic movement and walking in busy environment.     Symptoms   Intensity at best: 1/10  Intensity at worst: 4/10  Average intensity: 2/10    Headaches   Patient reports headaches: No. "     Cervical exam   Ligament Laxity Testing   Alar ligament: WNL  Sharp Jose De Jesus: WNL  Modified VBI   Left: asymptomatic  Right: asymptomatic  Seated posture: forward head posture and internally rotated shoulders    Positional testing   Ave-Hallpike   Left posterior canal: symptomatic, torsional and upbeating  Right posterior canal: WNL  Roll test   Left horizontal canal: WNL  Right horizontal canal: WNL  Positional testing comment: Orthostatic Hypotension Assessment  BP Seated: 170/99mmHg HR: 84bpm  BP upon standin/81mmHg HR: 88bpm    =              Precautions: HTN, urinary urgency, personal history of falls, GI distress & early satiety, lightheadedness, hiatal hernia, DM.   POC expires: 3/20/24        Tests and Measures IE:             DHI                                                    Neuro Re-Ed             Ave hallpike R -, L + upbeating, torsional nystagmus <15s            Supine head roll test Negative bilaterally            Epley L performed 1x            Patient education BPPV, otoconia, mechanism of action, orthostatic hypotension, strategies for management (ankle pumps, glute squeezes), how billing works for physical therapy.                                                    Ther Ex                                                                                                                     Ther Activity                                       Gait Training                                       Modalities

## 2024-01-26 ENCOUNTER — TELEPHONE (OUTPATIENT)
Facility: CLINIC | Age: 74
End: 2024-01-26

## 2024-01-26 NOTE — TELEPHONE ENCOUNTER
Sunil Bagley,     My name is Timi Wade and I am a physical therapist seeing your patient, Edmund. I am reaching out to you because you are listed as one of the patient's primary care physicians and it appears that he has seen you more frequently than his actual primary care physician. Edmund expressed to me some concerns about early satiety. He states that he doesn't know why he feels that way, and he doesn't know if anyone has explored it yet. He also expressed significant and frequent GI distress. I just wanted to pass that information along to get your opinion.     Thank you in advance for your time, have a great weekend!    Sincerely,     Timi Wade PT, DPT, MS

## 2024-01-31 ENCOUNTER — OFFICE VISIT (OUTPATIENT)
Facility: CLINIC | Age: 74
End: 2024-01-31
Payer: COMMERCIAL

## 2024-01-31 DIAGNOSIS — R26.81 UNSTEADINESS ON FEET: ICD-10-CM

## 2024-01-31 DIAGNOSIS — H81.10 BPPV (BENIGN PAROXYSMAL POSITIONAL VERTIGO), UNSPECIFIED LATERALITY: Primary | ICD-10-CM

## 2024-01-31 DIAGNOSIS — Z91.81 PERSONAL HISTORY OF FALL: ICD-10-CM

## 2024-01-31 DIAGNOSIS — R42 LIGHTHEADEDNESS: ICD-10-CM

## 2024-01-31 PROCEDURE — 97112 NEUROMUSCULAR REEDUCATION: CPT

## 2024-01-31 NOTE — PROGRESS NOTES
Daily Note     Today's date: 2024  Patient name: Edmund Larose  : 1950  MRN: [id number]  Referring provider: Oswald Davidson MD  Dx:   Encounter Diagnosis     ICD-10-CM    1. BPPV (benign paroxysmal positional vertigo), unspecified laterality  H81.10       2. Lightheadedness  R42       3. Personal history of fall  Z91.81       4. Unsteadiness on feet  R26.81                      Subjective: Pt states that he has had minimal lightheadedness since he was last here.       Objective: See treatment diary below      Assessment: Pt was reassessed for BPPV and was found to be negative today. Provided extensive education on the vestibular anatomy, role in balance, BPPV mechanism of action, treatment, prognosis, likelihood for reoccurence, and other possible causes of dizziness. Trialed gait with HT and pt reported having difficulty with his gaze, so added gaze stabilization interventions. Pt reported constant lightheadedness throughout. Pt will continue to benefit from skilled PT intervention focused on balance, gait, and strengthening so that he can navigate his environment with less dizziness.       Plan: Continue per plan of care. Perform formal assessment of orthostatic hypotension next visit.      Precautions: HTN, urinary urgency, personal history of falls, GI distress & early satiety, lightheadedness, hiatal hernia, DM.   POC expires: 3/20/24        Tests and Measures IE:            DHI 6/100            BP:   149/87mmHg                                     Neuro Re-Ed             [place] hallpike R -, L + upbeating, torsional nystagmus <15s L negative            Supine head roll test Negative bilaterally            Epley L performed 1x            Patient education BPPV, otoconia, mechanism of action, orthostatic hypotension, strategies for management (ankle pumps, glute squeezes), how billing works for physical therapy.  BPPV, otoconia, vestibular anatomy, BPPV MOA, prognosis, likelihood of  "reoccurence, other causes of dizziness           Gaze stabilization  VORx1 2x30\" H & V    VORx2 2x30\" H & V           Gait w HT  2x20ft H & V                        Ther Ex                                                                                                                     Ther Activity                                       Gait Training                                       Modalities                                            "

## 2024-02-07 ENCOUNTER — OFFICE VISIT (OUTPATIENT)
Facility: CLINIC | Age: 74
End: 2024-02-07
Payer: COMMERCIAL

## 2024-02-07 DIAGNOSIS — R42 LIGHTHEADEDNESS: ICD-10-CM

## 2024-02-07 DIAGNOSIS — H81.10 BPPV (BENIGN PAROXYSMAL POSITIONAL VERTIGO), UNSPECIFIED LATERALITY: Primary | ICD-10-CM

## 2024-02-07 DIAGNOSIS — Z91.81 PERSONAL HISTORY OF FALL: ICD-10-CM

## 2024-02-07 DIAGNOSIS — R26.81 UNSTEADINESS ON FEET: ICD-10-CM

## 2024-02-07 PROCEDURE — 97112 NEUROMUSCULAR REEDUCATION: CPT

## 2024-02-07 NOTE — PROGRESS NOTES
"Daily Note     Today's date: 2024  Patient name: Edmund Larose  : 1950  MRN: [id number]  Referring provider: Oswald Davidson MD  Dx:   Encounter Diagnosis     ICD-10-CM    1. BPPV (benign paroxysmal positional vertigo), unspecified laterality  H81.10       2. Lightheadedness  R42       3. Personal history of fall  Z91.81       4. Unsteadiness on feet  R26.81                      Subjective: Pt states that he has not had any dizziness or lightheadedness since he was last here. He is feeling \"really good\"      Objective: See treatment diary below    Orthostatics:   Supine>sit: 140/77mmHg HR: 72bpm  > 150/75mmHg 77bpm  Sit > Stand: 151/77mmHg HR: 77bpm  >  149/73mmhg HR: 86bpm      Assessment: Pt participated in a skilled PT session focused on vestibular, balance, and gait intervention. Due to lack of dizziness symptoms since he was last here and negative assessment BPPV last visit, reassessment was not performed today. Orthostatics were assessed today to determine if BP change with position change is contributing to his symptoms. Exam results were unremarkable. Foam EC intervention performed with HT and pt exhibited frequent requiring Sunny to correct. Vorx2 was performed and pt was able to perform with improved success for sequencing today.  Pt will continue to benefit from skilled PT intervention focused on balance, vestibular, and gait intervention so that he can navigate his environment with increased safety and stability.       Plan: Continue per plan of care.      Precautions: HTN, urinary urgency, personal history of falls, GI distress & early satiety, lightheadedness, hiatal hernia, DM.   POC expires: 3/20/24        Tests and Measures IE:           DHI 6/100            BP:   149/87mmHg                                     Neuro Re-Ed             Ave hallpike R -, L + upbeating, torsional nystagmus <15s L negative            Supine head roll test Negative bilaterally            Epley L " "performed 1x            Patient education BPPV, otoconia, mechanism of action, orthostatic hypotension, strategies for management (ankle pumps, glute squeezes), how billing works for physical therapy.  BPPV, otoconia, vestibular anatomy, BPPV MOA, prognosis, likelihood of reoccurence, other causes of dizziness           Gaze stabilization  VORx1 2x30\" H & V    VORx2 2x30\" H & V     VORx2 3x30:           Gait + VORx2   6x40ft H & V          Gait w HT  2x20ft H & V 3x40ft V& H          Foam EC + HT   3x1 min H & V          Assessment of orthostatics   See objective section          Ther Ex                                                                                                                     Ther Activity                                       Gait Training                                       Modalities                                              "

## 2024-02-14 ENCOUNTER — OFFICE VISIT (OUTPATIENT)
Facility: CLINIC | Age: 74
End: 2024-02-14
Payer: COMMERCIAL

## 2024-02-14 DIAGNOSIS — H81.10 BPPV (BENIGN PAROXYSMAL POSITIONAL VERTIGO), UNSPECIFIED LATERALITY: Primary | ICD-10-CM

## 2024-02-14 DIAGNOSIS — R26.81 UNSTEADINESS ON FEET: ICD-10-CM

## 2024-02-14 DIAGNOSIS — Z91.81 PERSONAL HISTORY OF FALL: ICD-10-CM

## 2024-02-14 DIAGNOSIS — R42 LIGHTHEADEDNESS: ICD-10-CM

## 2024-02-14 PROCEDURE — 97112 NEUROMUSCULAR REEDUCATION: CPT

## 2024-02-14 NOTE — PROGRESS NOTES
"Daily Note     Today's date: 2024  Patient name: Edmund Larose  : 1950  MRN: [id number]  Referring provider: Oswald Davidson MD  Dx:   Encounter Diagnosis     ICD-10-CM    1. BPPV (benign paroxysmal positional vertigo), unspecified laterality  H81.10       2. Lightheadedness  R42       3. Personal history of fall  Z91.81       4. Unsteadiness on feet  R26.81                        Subjective: Pt states that he had one episode where he sat up from the couch on Saturday and the room was blurry for a couple of hours. He states that his vision was not clear. He states he has not had an episode like that before, so he hung his head back like we do to \"check the crystals\" and he felt a little dizzy.       Objective: See treatment diary below    Orthostatics:   Supine>sit: 140/77mmHg HR: 72bpm  > 150/75mmHg 77bpm  Sit > Stand: 151/77mmHg HR: 77bpm  >  149/73mmhg HR: 86bpm      Assessment: Pt participated in a skilled PT session focused on vestibular, balance, and gait intervention. Pt was assessed again for BPPV today to recurrent episode and testing was negative in all positions. He presented with hypertension, as he has across the board, so discussed how potentially elevated BP could cause alterations in dizziness & vision for a few hours with a position change.  Encouraged pt to reach out to his doctor about his BP medication to assess. Noted history of increased symptoms when bending forward, so trialed [place] and [place]aw and while pt could not achieve full range due to cervical limitations, pt was asymptomatic.  Pt will continue to benefit from skilled PT intervention focused on balance, vestibular, and gait intervention so that he can navigate his environment with increased safety and stability.       Plan: Continue per plan of care.      Precautions: HTN, urinary urgency, personal history of falls, GI distress & early satiety, lightheadedness, hiatal hernia, DM.   POC expires: 3/20/24        Tests and Measures " "IE: 1/24 1/31 2/7 2/14         DHI 6/100            BP:   149/87mmHg  161/82mmHg HR 83bpm                                   Neuro Re-Ed             Ave hallpike R -, L + upbeating, torsional nystagmus <15s L negative   R & L negative         Supine head roll test Negative bilaterally            Epley L performed 1x            Patient education BPPV, otoconia, mechanism of action, orthostatic hypotension, strategies for management (ankle pumps, glute squeezes), how [phone] works for physical therapy.  BPPV, otoconia, vestibular anatomy, BPPV MOA, prognosis, likelihood of reoccurence, other causes of dizziness  High BP, importance of following up with physician and tracking home to determine if this is a trend and is contributing to sensation of blurriness         Gaze stabilization  VORx1 2x30\" H & V    VORx2 2x30\" H & V     VORx2 3x30:           Gait + VORx2   6x40ft H & V 6x 40ft         Vestibular circles    2x10 each direction with ball         Gait w HT  2x20ft H & V 3x40ft V& H 3x40ft diagonals both patterns         [place] & Yaw    5x         Foam EC + HT   3x1 min H & V 3x1 min EC + HT H & V         Assessment of orthostatics   See objective section          Ther Ex                                                                                                                     Ther Activity                                       Gait Training                                       Modalities                                              "

## 2024-02-21 ENCOUNTER — OFFICE VISIT (OUTPATIENT)
Facility: CLINIC | Age: 74
End: 2024-02-21
Payer: COMMERCIAL

## 2024-02-21 DIAGNOSIS — H81.10 BPPV (BENIGN PAROXYSMAL POSITIONAL VERTIGO), UNSPECIFIED LATERALITY: Primary | ICD-10-CM

## 2024-02-21 DIAGNOSIS — R42 LIGHTHEADEDNESS: ICD-10-CM

## 2024-02-21 DIAGNOSIS — Z91.81 PERSONAL HISTORY OF FALL: ICD-10-CM

## 2024-02-21 DIAGNOSIS — R26.81 UNSTEADINESS ON FEET: ICD-10-CM

## 2024-02-21 PROCEDURE — 97112 NEUROMUSCULAR REEDUCATION: CPT

## 2024-02-21 NOTE — PROGRESS NOTES
PT Discharge    Today's date: 2024  Patient name: Edmund Larose  : 1950  MRN: [id number]  Referring provider: Oswald Davidson MD  Dx:   Encounter Diagnosis     ICD-10-CM    1. BPPV (benign paroxysmal positional vertigo), unspecified laterality  H81.10       2. Lightheadedness  R42       3. Personal history of fall  Z91.81       4. Unsteadiness on feet  R26.81                      Assessment  Assessment details: 2024: Pt is a 73 y.o. male who has received 5 visits of skilled PT intervention addressing balance, gait, and strengthening intervention. Pt is no longer having room spinning dizziness or any dizziness aside from the occasional lightheadedness with position change from supine > sit or sit> stand. DHI was performed and pt scored a 2 today, reflecting mild perception of dizziness. FGA was performed and pt is not at risk of falls. Etiology of BPPV and likelihood of recurrence was explained to the pt and his wife. They expressed understanding. Also discussed how residual lightheadedness may be due to elevated BP's across session and encouraged pt to follow up with his doctor about ongoing elevated BP. Pt was provided with updated HEP, however, he noted that he is unlikely to perform exercises for maintenance. Pt is therefore discharged from skilled PT at this time.     At IE: Pt is a 73 y.o. male presenting to physical therapy with chief complaint of  constant lightheadedness that is sometimes exacerbated with bed mobility and positional changes from supine to sit and sit to stand. Today's assessment reflects positive L lance hallpike as indicated by left torsional, upbeating nystagmus lasting <15s, which is consistent with dx of left posterior canal BPPV. Canalith repositioning maneuver was was performed 1x with no reported change in pt's symptoms, would benefit from second person present for maneuver due to his mobility limitations. Pt noted mild cervical discomfort in each position due to  history of cervical pain. Cervical screening for red flags was unremarkable. Pt was assessed for orthostatic hypotension and pt exhibited a 19mmHg drop in systolic BP and 19mmHg drop in diastolic BP when transitioning from sit to stand, reflecting presence of orthostatic hypotension. Pt was educated on importance of performing activities such as ankle pumps, glute squeezes, or other muscle contraction activities to activate the muscle pump and aid in venous return prior to position changes, pt expressed understanding. Pt also reports history of multiple falls and would benefit from balance intervention to prevent future falls. Systemically, pt expressed concern surrounding his early satiety, so plan to reach out to physician to express these concerns to them. Pt noted increased cervical discomfort at the conclusion of session, instructed pt utilize ice or heat to reduce p!. Pt will benefit from skilled physical therapy intervention addressing dysfunctions of gait, balance, and mobility so that they may be able to perform self-care tasks and ambulate safely.      Impairments: abnormal gait, abnormal movement, difficulty understanding, impaired balance and safety issue  Functional limitations: gait, stair navigation, rolling  Symptom irritability: lowUnderstanding of Dx/Px/POC: good   Prognosis: good    Goals  STG's: To be achieved within 4 weeks  -Pt will exhibit independence with HEP within 2 weeks. MET  -Pt will maintain dizziness rating <3/10 for 7 consecutive days. MET  -FGA to be performed at future visits to assess instability with gait. MET    LTG's: TBA within 8 weeks  -pt will be able to ambulate for 40ft with horizontal head turns without LOB so that they may be able to grocery shop without limitation within 6 weeks MET  -pt will report 0/10 instances of vertigo with rolling when getting of bed to improve safety with bed mobility within 4 weeks MET  -Pt will exhibit negative L lance hallpike to reflect  "resolution of BPPV MET  -Pt will be able to verbalize the implementation of management strategies for orthostatic hypotension independently. MET    Plan  Plan details: Discharge from skilled PT  Patient would benefit from: skilled physical therapy  Planned modality interventions: electrical stimulation/Russian stimulation, thermotherapy: hydrocollator packs, biofeedback and cryotherapy  Planned therapy interventions: balance, ADL training, abdominal trunk stabilization, activity modification, balance/weight bearing training, behavior modification, body mechanics training, breathing training, canalith repositioning, neuromuscular re-education, patient education, therapeutic activities, therapeutic exercise, strengthening, sensory integrative techniques, gait training and home exercise program  Frequency: 2x week  Duration in visits: 16  Duration in weeks: 8  Plan of Care beginning date: 1/24/2024  Plan of Care expiration date: 3/20/2024  Treatment plan discussed with: patient        Subjective Evaluation    History of Present Illness  Date of onset: 11/28/2023  Mechanism of injury: 2/21/2024: Pt states that he is not having any dizziness with rolling at this point. He reports 0/10 dizziness in the last week. He notes occasional lightheadedness when transitioning from laying down to standing.    At IE: Pt states that he is hear for PT because \"my ear doctor told me to get checked out for the crystals.\" Pt has had episodic dizziness spanning >20 years. He had two \"attacks\" or room spinning dizziness where he had to go the ER. Since then, it has been intermittent lightheadedness. It is never room spinning at this time. With this episode, pt became very lightheaded when bending forward to perform a task, and he fell forwards into a split rail fence. He got stitches on his head. He did not sustain a concussion and all imaging was unremarkable. Pt states that his lightheadedness has improved since he hit his head, however, " "he is having some left neck/upper shoulder discomfort that worsens with UE movement. He states that he was supposed to get PT for that, but \"I\"m ignoring it and it is feeling better,\" so he is not going to pursue PT for it at this time. He notes that his neck pain worsens with sidebending his neck L & R. Regarding the quality of his dizziness, pt notes it is exclusively a lightheadedness. He does not report any nauesa or GI upset, and reports no syncopal history. He has had previous vestibular therapy at a facility in [place], and it helped a little, but did not fix his issues. Pt states that his neurologist wanted the PT's at his clinic to perform a test with a machine, but the pt is not sure what it was and does not think it was performed. His dizziness ranges from 1-4/10. Other exacerbating factors include transitioning from laying down to standing up & getting into or out of bed. He notes that he has had a few falls, usually when bending forward to work on heaters. He has some mild concern about his balance. His goals are to reduce his dizziness & understand what's causing it, although he is not optimistic that that will be possible.     Systemically, pt notes that he also experiences early satiety, and significant GI distress. He notes that he also experiences increased urinary urgency that is due to something bladder or prostate related, but he isn't sure which.           Recurrent probem    Patient Goals  Patient goal: understand his dizziness & resolve it  Pain  Current pain ratin  At best pain ratin  At worst pain ratin  Location: left side of his neck. \"Feels like a muscle has been stretched or jammed.\"  Quality: throbbing, pulling and tight  Aggravating factors: nothing  Progression: improved    Social Support  Steps to enter house: yes  13  Stairs in house: yes   2  Lives in: multiple-level home  Lives with: spouse    Employment status: not working  Hand dominance: " right    Treatments  Previous treatment: physical therapy        Objective     Concurrent Complaints  Positive for tinnitus and hearing loss. Negative for headaches, nausea/motion sickness, visual change, memory loss, aural fullness and poor concentration    Static Posture     Head  Forward.    Shoulders  Rounded.  Neuro Exam:     Dizziness  Positive for vertigo and light-headedness.   Negative for disequilibrium, oscillopsia, motion sickness, rocking or swaying, diplopia and floating or swimming.     Exacerbating factors  Positive for bending over, rolling in bed and supine to/from sitting.   Negative for looking up, walking, turning head, optokinetic movement and walking in busy environment.     Symptoms   Intensity at best: 1/10  Intensity at worst: 4/10  Average intensity: 2/10    Headaches   Patient reports headaches: No.     Cervical exam   Ligament Laxity Testing   Alar ligament: WNL  Sharp Jose De Jesus: WNL  Modified VBI   Left: asymptomatic  Right: asymptomatic  Seated posture: forward head posture and internally rotated shoulders    Oculomotor exam   Oculomotor ROM: WNL  Gaze holding nystagmus: not present left  and not present right  Smooth pursuits: within normal limits  Vertical saccades: normal  Horizontal saccades: normal  Convergence: normal  Head thrust: left normal and right normal    Positional testing   [place]-Hallpike   Left posterior canal: WNL  Right posterior canal: WNL  Roll test   Left horizontal canal: WNL  Right horizontal canal: WNL  Positional testing comment: Orthostatic Hypotension Assessment  BP Seated: 170/99mmHg HR: 84bpm  BP upon standin/81mmHg HR: 88bpm               Precautions: HTN, urinary urgency, personal history of falls, GI distress & early satiety, lightheadedness, hiatal hernia, DM.   POC expires: 3/20/24        Tests and Measures IE:         DHI 6/100            BP:   149/87mmHg  161/82mmHg HR 83bpm                                   Neuro Re-Ed        "      [place] hallpike R -, L + upbeating, torsional nystagmus <15s L negative   R & L negative         Supine head roll test Negative bilaterally            VOR cancellation     1x30 horizontal 1x30s vertical        Epley L performed 1x            Patient education BPPV, otoconia, mechanism of action, orthostatic hypotension, strategies for management (ankle pumps, glute squeezes), how billing works for physical therapy.  BPPV, otoconia, vestibular anatomy, BPPV MOA, prognosis, likelihood of reoccurence, other causes of dizziness  High BP, importance of following up with physician and tracking home to determine if this is a trend and is contributing to sensation of blurriness HEP, hypertension, importnce of following up with physician, review of BPPV & vestibular anatomy with patient's spouse, importance of completing HEP for maintenance. Deferred all questions about medications to physicians.         Gaze stabilization  VORx1 2x30\" H & V    VORx2 2x30\" H & V     VORx2 3x30:           Gait + VORx2   6x40ft H & V 6x 40ft         Vestibular circles    2x10 each direction with ball         Gait w HT  2x20ft H & V 3x40ft V& H 3x40ft diagonals both patterns         [place] & [place]    5x         Tests and Measures     DHI, FGA, oculomotor screen, review of results        Foam EC + HT   3x1 min H & V 3x1 min EC + HT H & V         Assessment of orthostatics   See objective section          Ther Ex                                                                                                                     Ther Activity                                       Gait Training                                       Modalities                                            "

## 2024-02-28 ENCOUNTER — APPOINTMENT (OUTPATIENT)
Facility: CLINIC | Age: 74
End: 2024-02-28
Payer: COMMERCIAL

## 2024-03-11 LAB
ALBUMIN SERPL-MCNC: 4.5 G/DL (ref 3.6–5.1)
ALBUMIN/GLOB SERPL: 1.6 (CALC) (ref 1–2.5)
ALP SERPL-CCNC: 65 U/L (ref 35–144)
ALT SERPL-CCNC: 21 U/L (ref 9–46)
AST SERPL-CCNC: 16 U/L (ref 10–35)
BASOPHILS # BLD AUTO: 108 CELLS/UL (ref 0–200)
BASOPHILS NFR BLD AUTO: 1.2 %
BILIRUB SERPL-MCNC: 0.4 MG/DL (ref 0.2–1.2)
BUN SERPL-MCNC: 13 MG/DL (ref 7–25)
BUN/CREAT SERPL: ABNORMAL (CALC) (ref 6–22)
CALCIUM SERPL-MCNC: 10 MG/DL (ref 8.6–10.3)
CHLORIDE SERPL-SCNC: 104 MMOL/L (ref 98–110)
CO2 SERPL-SCNC: 25 MMOL/L (ref 20–32)
CREAT SERPL-MCNC: 0.7 MG/DL (ref 0.7–1.28)
EOSINOPHIL # BLD AUTO: 486 CELLS/UL (ref 15–500)
EOSINOPHIL NFR BLD AUTO: 5.4 %
ERYTHROCYTE [DISTWIDTH] IN BLOOD BY AUTOMATED COUNT: 13 % (ref 11–15)
EST. AVERAGE GLUCOSE BLD GHB EST-MCNC: 169 MG/DL
EST. AVERAGE GLUCOSE BLD GHB EST-SCNC: 9.3 MMOL/L
GFR/BSA.PRED SERPLBLD CYS-BASED-ARV: 99 ML/MIN/1.73M2
GLOBULIN SER CALC-MCNC: 2.8 G/DL (CALC) (ref 1.9–3.7)
GLUCOSE SERPL-MCNC: 185 MG/DL (ref 65–99)
HBA1C MFR BLD: 7.5 % OF TOTAL HGB
HCT VFR BLD AUTO: 43 % (ref 38.5–50)
HGB BLD-MCNC: 14.3 G/DL (ref 13.2–17.1)
LYMPHOCYTES # BLD AUTO: 2061 CELLS/UL (ref 850–3900)
LYMPHOCYTES NFR BLD AUTO: 22.9 %
MCH RBC QN AUTO: 28.7 PG (ref 27–33)
MCHC RBC AUTO-ENTMCNC: 33.3 G/DL (ref 32–36)
MCV RBC AUTO: 86.2 FL (ref 80–100)
MONOCYTES # BLD AUTO: 702 CELLS/UL (ref 200–950)
MONOCYTES NFR BLD AUTO: 7.8 %
NEUTROPHILS # BLD AUTO: 5643 CELLS/UL (ref 1500–7800)
NEUTROPHILS NFR BLD AUTO: 62.7 %
PLATELET # BLD AUTO: 282 THOUSAND/UL (ref 140–400)
PMV BLD REES-ECKER: 10.3 FL (ref 7.5–12.5)
POTASSIUM SERPL-SCNC: 4.5 MMOL/L (ref 3.5–5.3)
PROT SERPL-MCNC: 7.3 G/DL (ref 6.1–8.1)
RBC # BLD AUTO: 4.99 MILLION/UL (ref 4.2–5.8)
SODIUM SERPL-SCNC: 139 MMOL/L (ref 135–146)
T4 FREE SERPL-MCNC: 1.1 NG/DL (ref 0.8–1.8)
TSH SERPL-ACNC: 5.02 MIU/L (ref 0.4–4.5)
WBC # BLD AUTO: 9 THOUSAND/UL (ref 3.8–10.8)

## 2024-03-13 ENCOUNTER — OFFICE VISIT (OUTPATIENT)
Dept: ENDOCRINOLOGY | Facility: HOSPITAL | Age: 74
End: 2024-03-13
Payer: COMMERCIAL

## 2024-03-13 VITALS
OXYGEN SATURATION: 97 % | WEIGHT: 229 LBS | HEART RATE: 73 BPM | SYSTOLIC BLOOD PRESSURE: 138 MMHG | BODY MASS INDEX: 31.02 KG/M2 | DIASTOLIC BLOOD PRESSURE: 68 MMHG | HEIGHT: 72 IN

## 2024-03-13 DIAGNOSIS — E11.8 TYPE 2 DIABETES MELLITUS WITH COMPLICATION (HCC): ICD-10-CM

## 2024-03-13 DIAGNOSIS — E06.3 HASHIMOTO'S THYROIDITIS: ICD-10-CM

## 2024-03-13 DIAGNOSIS — E11.65 TYPE 2 DIABETES MELLITUS WITH HYPERGLYCEMIA, WITHOUT LONG-TERM CURRENT USE OF INSULIN (HCC): Primary | ICD-10-CM

## 2024-03-13 DIAGNOSIS — I10 PRIMARY HYPERTENSION: ICD-10-CM

## 2024-03-13 DIAGNOSIS — E11.42 DIABETIC POLYNEUROPATHY ASSOCIATED WITH TYPE 2 DIABETES MELLITUS (HCC): ICD-10-CM

## 2024-03-13 PROBLEM — E11.3292 MILD NONPROLIFERATIVE DIABETIC RETINOPATHY OF LEFT EYE WITHOUT MACULAR EDEMA ASSOCIATED WITH TYPE 2 DIABETES MELLITUS (HCC): Status: ACTIVE | Noted: 2024-03-13

## 2024-03-13 PROCEDURE — 99214 OFFICE O/P EST MOD 30 MIN: CPT | Performed by: INTERNAL MEDICINE

## 2024-03-13 RX ORDER — SITAGLIPTIN AND METFORMIN HYDROCHLORIDE 1000; 50 MG/1; MG/1
TABLET, FILM COATED ORAL
Qty: 180 TABLET | Refills: 3 | Status: SHIPPED | OUTPATIENT
Start: 2024-03-13

## 2024-03-13 NOTE — ASSESSMENT & PLAN NOTE
He has some mild subclinical hypothyroidism, but he does not have any symptoms and does not want to start any thyroid hormone if it is not absolutely necessary. I will follow this over time.

## 2024-03-13 NOTE — PATIENT INSTRUCTIONS
Hgba1c is 7.5%. this is slightly imp[roved.     Continue the same janumet for now.     Continue to work on diet and exercise and water intake.     I'll wait to see what the GI doc has to say.     Continue to test blood work up to once daily.     Follow up in 6 months with blood work.

## 2024-03-13 NOTE — ASSESSMENT & PLAN NOTE
Most recent hemoglobin A1c is 7.5%, which does continue to improve and demonstrates fair control of his diabetes. For now, he will continue the same Janumet as he works on dietary changes and exercise and increasing water intake as the warmer weather ensues. He will continue to test his blood glucose up to once a day.

## 2024-09-12 LAB
ALBUMIN SERPL-MCNC: 4.3 G/DL (ref 3.6–5.1)
ALBUMIN/CREAT UR: 3 MG/G CREAT
ALBUMIN/GLOB SERPL: 1.9 (CALC) (ref 1–2.5)
ALP SERPL-CCNC: 56 U/L (ref 35–144)
ALT SERPL-CCNC: 23 U/L (ref 9–46)
AST SERPL-CCNC: 18 U/L (ref 10–35)
BILIRUB SERPL-MCNC: 0.4 MG/DL (ref 0.2–1.2)
BUN SERPL-MCNC: 13 MG/DL (ref 7–25)
BUN/CREAT SERPL: ABNORMAL (CALC) (ref 6–22)
CALCIUM SERPL-MCNC: 9.5 MG/DL (ref 8.6–10.3)
CHLORIDE SERPL-SCNC: 103 MMOL/L (ref 98–110)
CHOLEST SERPL-MCNC: 160 MG/DL
CHOLEST/HDLC SERPL: 3.6 (CALC)
CO2 SERPL-SCNC: 26 MMOL/L (ref 20–32)
CREAT SERPL-MCNC: 0.8 MG/DL (ref 0.7–1.28)
CREAT UR-MCNC: 110 MG/DL (ref 20–320)
GFR/BSA.PRED SERPLBLD CYS-BASED-ARV: 94 ML/MIN/1.73M2
GLOBULIN SER CALC-MCNC: 2.3 G/DL (CALC) (ref 1.9–3.7)
GLUCOSE SERPL-MCNC: 177 MG/DL (ref 65–99)
HBA1C MFR BLD: 8.1 % OF TOTAL HGB
HDLC SERPL-MCNC: 44 MG/DL
LDLC SERPL CALC-MCNC: 92 MG/DL (CALC)
MICROALBUMIN UR-MCNC: 0.3 MG/DL
NONHDLC SERPL-MCNC: 116 MG/DL (CALC)
POTASSIUM SERPL-SCNC: 4.5 MMOL/L (ref 3.5–5.3)
PROT SERPL-MCNC: 6.6 G/DL (ref 6.1–8.1)
SODIUM SERPL-SCNC: 137 MMOL/L (ref 135–146)
T4 FREE SERPL-MCNC: 1.2 NG/DL (ref 0.8–1.8)
TRIGL SERPL-MCNC: 147 MG/DL
TSH SERPL-ACNC: 3.84 MIU/L (ref 0.4–4.5)

## 2024-09-13 ENCOUNTER — OFFICE VISIT (OUTPATIENT)
Dept: ENDOCRINOLOGY | Facility: HOSPITAL | Age: 74
End: 2024-09-13
Payer: COMMERCIAL

## 2024-09-13 VITALS
HEIGHT: 72 IN | SYSTOLIC BLOOD PRESSURE: 126 MMHG | DIASTOLIC BLOOD PRESSURE: 80 MMHG | OXYGEN SATURATION: 98 % | HEART RATE: 73 BPM | BODY MASS INDEX: 30.75 KG/M2 | WEIGHT: 227 LBS

## 2024-09-13 DIAGNOSIS — I10 PRIMARY HYPERTENSION: ICD-10-CM

## 2024-09-13 DIAGNOSIS — E06.3 HASHIMOTO'S THYROIDITIS: ICD-10-CM

## 2024-09-13 DIAGNOSIS — E11.65 TYPE 2 DIABETES MELLITUS WITH HYPERGLYCEMIA, WITHOUT LONG-TERM CURRENT USE OF INSULIN (HCC): Primary | ICD-10-CM

## 2024-09-13 DIAGNOSIS — E11.42 DIABETIC POLYNEUROPATHY ASSOCIATED WITH TYPE 2 DIABETES MELLITUS (HCC): ICD-10-CM

## 2024-09-13 PROCEDURE — 99215 OFFICE O/P EST HI 40 MIN: CPT | Performed by: INTERNAL MEDICINE

## 2024-09-13 NOTE — PATIENT INSTRUCTIONS
Hgba1c is 8.1%. this is a bit higher.     Work on getting back to exercise.      Make sure that snacking at night around 15 grams carb or less.    Continue the same janumet for now.     Continue to test blood sugars once daily.     Work on reevaluation with GI doctor.     Follow up in 6 months with blood work.

## 2024-09-13 NOTE — PROGRESS NOTES
9/15/2024    Assessment & Plan      Diagnoses and all orders for this visit:    Type 2 diabetes mellitus with hyperglycemia, without long-term current use of insulin (HCC)  -     Comprehensive metabolic panel; Future  -     Hemoglobin A1C; Future  -     T4, free; Future  -     TSH, 3rd generation; Future    Diabetic polyneuropathy associated with type 2 diabetes mellitus (HCC)  -     Comprehensive metabolic panel; Future  -     Hemoglobin A1C; Future  -     T4, free; Future  -     TSH, 3rd generation; Future    Hashimoto's thyroiditis  -     Comprehensive metabolic panel; Future  -     Hemoglobin A1C; Future  -     T4, free; Future  -     TSH, 3rd generation; Future    Primary hypertension  -     Comprehensive metabolic panel; Future  -     Hemoglobin A1C; Future  -     T4, free; Future  -     TSH, 3rd generation; Future          Assessment & Plan  1. Type 2 Diabetes Mellitus.  Most recent hemoglobin A1c has increased to 8.1%. He has not been exercising as much due to the heat and has been consuming snacks at night that are almost 40 g of carbohydrate. He will continue the same Janumet 50/1000 mg twice a day. He will resume regular exercise and reduce his snacking to about 15 g of carbohydrate at night if he snacks at all. He will continue to test his blood sugars once a day. The possibility of switching to Janumet XR was discussed to potentially improve his GI symptoms of bloating and gas, although these symptoms preceded the use of Janumet. This change will be held off as he will follow up with gastroenterology for further evaluation, as he may have gastroparesis and might benefit from metoclopramide. Further evaluation for small bowel overgrowth syndrome may also be necessary.    2. Diabetic Neuropathy.  He has stable neuropathic symptoms, and diabetic foot exams are up to date.    3. Hashimoto's Thyroiditis.  Most recent thyroid function studies are normal, indicating biochemical euthyroidism. He will continue  to have his thyroid blood work monitored over time to evaluate for the onset of hypothyroidism.    4. Hypertension.  He is normotensive in the office on his current dose of amlodipine 5 mg twice a day.    Follow-up  He will follow up in 6 months with preceding hemoglobin A1c, CMP, TSH, and free T4.    I have spent a total time of 40 minutes in caring for this patient on the day of the visit/encounter including Diagnostic results, Prognosis, Risks and benefits of tx options, Instructions for management, Patient and family education, Importance of tx compliance, Risk factor reductions, Impressions, Counseling / Coordination of care, Documenting in the medical record, Reviewing / ordering tests, medicine, procedures  , and Obtaining or reviewing history  .      CC: Diabetes type II, thyroid, blood pressure, lipid follow-up      History of Present Illness    HPI: Edmund Larose is a 74-year-old male with type 2 diabetes, Hashimoto's thyroiditis, and hypertension, here for a follow-up visit. He is accompanied by his wife.    He reports frequent urination, up to six times during the night, but does not experience excessive thirst or hunger. His appetite is generally low. He has discontinued sugar-free products due to gastrointestinal discomfort. He experiences bloating and gas, but no diarrhea.  His symptoms have remained consistent over the years, occasionally worsening. He has lost weight and often feels unwell. Despite not feeling hungry, he eats to take his medication. A previous test indicated slow digestion.     His current medication includes Janumet 50/1000 mg twice daily.     Diabetic complications include diabetic neuropathy. He reports no nephropathy, retinopathy, heart attack, stroke, or claudication.    He experienced a dizzy spell when he had crystals, but this has since resolved. He does not report any chest pain. However, he feels short of breath upon exertion, which he attributes to being out of shape. He  is taking amlodipine 5 mg twice daily for blood pressure management.    His physical activity has decreased recently due to hot weather.    Blood Sugar/Glucometer/Pump/CGM review: He monitors his blood sugar levels daily in the morning, which typically range from 150 to 170.       Historical Information   Past Medical History:   Diagnosis Date    Diabetes mellitus (HCC)     Early satiety 01/24/2024    patient self report    Fatty liver     GERD (gastroesophageal reflux disease)     Hiatal hernia     Hypertension     Urinary urgency 01/24/2024    patient self report, pt states that he cannot remember if it is due to his bladder or his prostate     Past Surgical History:   Procedure Laterality Date    CATARACT EXTRACTION Right 11/2021    CATARACT EXTRACTION Left 2019     Social History   Social History     Substance and Sexual Activity   Alcohol Use Yes    Comment: social     Social History     Substance and Sexual Activity   Drug Use No     Social History     Tobacco Use   Smoking Status Never   Smokeless Tobacco Never     Family History:   Family History   Problem Relation Age of Onset    Hypertension Mother     Heart disease Father         lung collapsed, several heart attacks    Diabetes type II Sister     Hypertension Sister     Diabetes type II Sister     Hypertension Sister     Sherwood's disease Sister     Hypertension Sister     Hypertension Sister     Hypertension Sister     Diabetes type II Brother     Heart disease Brother     Hypertension Brother        Meds/Allergies   Current Outpatient Medications   Medication Sig Dispense Refill    amLODIPine (NORVASC) 5 mg tablet Take 5 mg by mouth 2 (two) times a day       Ascorbic Acid (Vitamin C) 500 MG CAPS Take by mouth daily      Calcium Carb-Cholecalciferol (Caltrate 600+D3) 600-800 MG-UNIT TABS Take by mouth 2 (two) times a day      glucose blood (OneTouch Ultra) test strip Use to test blood sugars once a day 100 strip 4    ibuprofen (MOTRIN) 600 mg tablet  Take 1 tablet (600 mg total) by mouth every 6 (six) hours as needed for mild pain 30 tablet 0    multivitamin-minerals (CENTRUM) tablet Take 1 tablet by mouth daily      Omega-3 Fatty Acids (FISH OIL) 1,000 mg Take 1,000 mg by mouth daily      sitaGLIPtin-metFORMIN (Janumet)  MG per tablet Take 1 tablet daily after breakfast and after supper 180 tablet 3    vitamin E, tocopherol, 400 units capsule Take 400 Units by mouth daily      bacitracin topical ointment 500 units/g topical ointment Apply 1 large application topically 2 (two) times a day for 7 days 14 g 0    Specialty Vitamins Products (PROSTATE PO) Take by mouth 2 (two) times a day (Patient not taking: Reported on 9/13/2024)       No current facility-administered medications for this visit.     Allergies   Allergen Reactions    Actos [Pioglitazone] GI Intolerance     Significant GI upset       Objective   Vitals: Blood pressure 126/80, pulse 73, height 6' (1.829 m), weight 103 kg (227 lb), SpO2 98%.  Invasive Devices       None                   Physical Exam    Thyroid is normal in size. No palpable thyroid nodules.  Lungs are clear to auscultation.  Heart has a regular rate and rhythm. No murmurs.  No lower extremity edema in the musculoskeletal system.      The history was obtained from the review of the chart and from the patient.    Lab Results:    Most recent Alc is  Lab Results   Component Value Date    HGBA1C 8.1 (H) 09/11/2024           Blood work performed on 9/11/2024 showed a TSH of 3.84 with a free T4 of 1.2.    CMP showed glucose of 177 fasting but was otherwise normal.    Urine microalbumin to creatinine ratio is 3.    Lab Results   Component Value Date    CREATININE 0.80 09/11/2024    CREATININE 0.70 03/11/2024    CREATININE 0.89 11/28/2023    BUN 13 09/11/2024    K 4.5 09/11/2024     09/11/2024    CO2 26 09/11/2024     eGFR   Date Value Ref Range Status   09/11/2024 94 > OR = 60 mL/min/1.73m2 Final   11/28/2023 86 ml/min/1.73sq m  Final     Total cholesterol 160, LDL cholesterol 92.    Lab Results   Component Value Date    HDL 44 09/11/2024    TRIG 147 09/11/2024       Lab Results   Component Value Date    ALT 23 09/11/2024    AST 18 09/11/2024    ALKPHOS 56 09/11/2024       Lab Results   Component Value Date    TSH 3.84 09/11/2024    FREET4 1.2 09/11/2024             Future Appointments   Date Time Provider Department Center   3/13/2025  2:20 PM Iza Bagley MD [facility]

## 2024-09-15 NOTE — ADDENDUM NOTE
Addended by: SHEMAR GORDON on: 9/15/2024 10:49 AM     Modules accepted: Level of Service     DISPLAY PLAN FREE TEXT DISPLAY PLAN FREE TEXT DISPLAY PLAN FREE TEXT DISPLAY PLAN FREE TEXT DISPLAY PLAN FREE TEXT DISPLAY PLAN FREE TEXT DISPLAY PLAN FREE TEXT DISPLAY PLAN FREE TEXT DISPLAY PLAN FREE TEXT DISPLAY PLAN FREE TEXT

## 2024-12-02 LAB
LEFT EYE DIABETIC RETINOPATHY: POSITIVE
RIGHT EYE DIABETIC RETINOPATHY: POSITIVE

## 2025-03-11 ENCOUNTER — RESULTS FOLLOW-UP (OUTPATIENT)
Dept: ENDOCRINOLOGY | Facility: HOSPITAL | Age: 75
End: 2025-03-11

## 2025-03-11 LAB
ALBUMIN SERPL-MCNC: 4.3 G/DL (ref 3.6–5.1)
ALBUMIN/GLOB SERPL: 1.9 (CALC) (ref 1–2.5)
ALP SERPL-CCNC: 57 U/L (ref 35–144)
ALT SERPL-CCNC: 22 U/L (ref 9–46)
AST SERPL-CCNC: 16 U/L (ref 10–35)
BILIRUB SERPL-MCNC: 0.3 MG/DL (ref 0.2–1.2)
BUN SERPL-MCNC: 15 MG/DL (ref 7–25)
BUN/CREAT SERPL: ABNORMAL (CALC) (ref 6–22)
CALCIUM SERPL-MCNC: 9.4 MG/DL (ref 8.6–10.3)
CHLORIDE SERPL-SCNC: 105 MMOL/L (ref 98–110)
CO2 SERPL-SCNC: 24 MMOL/L (ref 20–32)
CREAT SERPL-MCNC: 0.74 MG/DL (ref 0.7–1.28)
GFR/BSA.PRED SERPLBLD CYS-BASED-ARV: 96 ML/MIN/1.73M2
GLOBULIN SER CALC-MCNC: 2.3 G/DL (CALC) (ref 1.9–3.7)
GLUCOSE SERPL-MCNC: 172 MG/DL (ref 65–99)
HBA1C MFR BLD: 8.1 % OF TOTAL HGB
POTASSIUM SERPL-SCNC: 4.3 MMOL/L (ref 3.5–5.3)
PROT SERPL-MCNC: 6.6 G/DL (ref 6.1–8.1)
SODIUM SERPL-SCNC: 138 MMOL/L (ref 135–146)
T4 FREE SERPL-MCNC: 1.3 NG/DL (ref 0.8–1.8)
TSH SERPL-ACNC: 3.53 MIU/L (ref 0.4–4.5)

## 2025-03-13 ENCOUNTER — OFFICE VISIT (OUTPATIENT)
Dept: ENDOCRINOLOGY | Facility: HOSPITAL | Age: 75
End: 2025-03-13
Payer: COMMERCIAL

## 2025-03-13 VITALS
DIASTOLIC BLOOD PRESSURE: 82 MMHG | BODY MASS INDEX: 31.15 KG/M2 | SYSTOLIC BLOOD PRESSURE: 150 MMHG | HEIGHT: 72 IN | WEIGHT: 230 LBS | HEART RATE: 78 BPM

## 2025-03-13 DIAGNOSIS — E06.3 HASHIMOTO'S THYROIDITIS: ICD-10-CM

## 2025-03-13 DIAGNOSIS — E11.42 DIABETIC POLYNEUROPATHY ASSOCIATED WITH TYPE 2 DIABETES MELLITUS (HCC): ICD-10-CM

## 2025-03-13 DIAGNOSIS — E11.8 TYPE 2 DIABETES MELLITUS WITH COMPLICATION (HCC): ICD-10-CM

## 2025-03-13 DIAGNOSIS — I10 PRIMARY HYPERTENSION: ICD-10-CM

## 2025-03-13 DIAGNOSIS — E11.65 TYPE 2 DIABETES MELLITUS WITH HYPERGLYCEMIA, WITHOUT LONG-TERM CURRENT USE OF INSULIN (HCC): Primary | ICD-10-CM

## 2025-03-13 PROBLEM — E11.3293 MILD NONPROLIFERATIVE DIABETIC RETINOPATHY OF BOTH EYES WITHOUT MACULAR EDEMA ASSOCIATED WITH TYPE 2 DIABETES MELLITUS (HCC): Status: ACTIVE | Noted: 2024-03-13

## 2025-03-13 PROCEDURE — 99214 OFFICE O/P EST MOD 30 MIN: CPT | Performed by: INTERNAL MEDICINE

## 2025-03-13 PROCEDURE — G2211 COMPLEX E/M VISIT ADD ON: HCPCS | Performed by: INTERNAL MEDICINE

## 2025-03-13 RX ORDER — SITAGLIPTIN AND METFORMIN HYDROCHLORIDE 1000; 50 MG/1; MG/1
TABLET, FILM COATED ORAL
Qty: 180 TABLET | Refills: 3 | Status: SHIPPED | OUTPATIENT
Start: 2025-03-13

## 2025-03-13 NOTE — PATIENT INSTRUCTIONS
Hgba1c is 8.1%. this is still too high.     Continue the same janumet 50/1000 mg twice a day and take it before or with food and we'll see if the sugars are better. May need a another medicine next time.     Ok to try the diabetes supplement.    Always eat protein with carbohydrates.     Cutting down coffee, and increasing water.     Continue to test sugars once daily.     Follow up in 6 months with blood work.

## 2025-03-13 NOTE — PROGRESS NOTES
3/16/2025    Assessment & Plan      Diagnoses and all orders for this visit:    Type 2 diabetes mellitus with hyperglycemia, without long-term current use of insulin (HCC)  -     Comprehensive metabolic panel; Future  -     CBC and differential; Future  -     Lipid Panel with Direct LDL reflex; Future  -     Hemoglobin A1c (w/out EAG); Future  -     Microalbumin, Random Urine (W/Creatinine); Future  -     Comprehensive metabolic panel  -     CBC and differential  -     Lipid Panel with Direct LDL reflex  -     Hemoglobin A1c (w/out EAG)  -     Microalbumin, Random Urine (W/Creatinine)    Diabetic polyneuropathy associated with type 2 diabetes mellitus (HCC)  -     Comprehensive metabolic panel; Future  -     CBC and differential; Future  -     Lipid Panel with Direct LDL reflex; Future  -     Hemoglobin A1c (w/out EAG); Future  -     Microalbumin, Random Urine (W/Creatinine); Future  -     Comprehensive metabolic panel  -     CBC and differential  -     Lipid Panel with Direct LDL reflex  -     Hemoglobin A1c (w/out EAG)  -     Microalbumin, Random Urine (W/Creatinine)    Hashimoto's thyroiditis  -     Comprehensive metabolic panel; Future  -     CBC and differential; Future  -     Lipid Panel with Direct LDL reflex; Future  -     Hemoglobin A1c (w/out EAG); Future  -     Microalbumin, Random Urine (W/Creatinine); Future  -     Comprehensive metabolic panel  -     CBC and differential  -     Lipid Panel with Direct LDL reflex  -     Hemoglobin A1c (w/out EAG)  -     Microalbumin, Random Urine (W/Creatinine)    Primary hypertension  -     Comprehensive metabolic panel; Future  -     CBC and differential; Future  -     Lipid Panel with Direct LDL reflex; Future  -     Hemoglobin A1c (w/out EAG); Future  -     Microalbumin, Random Urine (W/Creatinine); Future  -     Comprehensive metabolic panel  -     CBC and differential  -     Lipid Panel with Direct LDL reflex  -     Hemoglobin A1c (w/out EAG)  -     Microalbumin,  Random Urine (W/Creatinine)    Type 2 diabetes mellitus with complication (HCC)  -     sitaGLIPtin-metFORMIN (Janumet)  MG per tablet; Take 1 tablet daily after breakfast and after supper          Assessment & Plan  1. Type 2 Diabetes Mellitus.  His hemoglobin A1c level remains elevated at 8.1, indicating suboptimal glycemic control. He is currently on the maximum dosage of Januvia and metformin. He has been diagnosed with mild diabetic retinopathy in both eyes and neuropathy. He is advised to continue his current Janumet regimen, taking it before or with meals to potentially improve blood glucose levels. He is encouraged to maintain a balanced diet, incorporating protein with carbohydrates, and to reduce caffeine intake while increasing water consumption. Daily blood glucose monitoring is recommended. He is also advised to continue his supplement regimen, which includes vitamin D, berberine, and cinnamon. A prescription refill for Janumet has been provided. Laboratory tests for cholesterol, liver function, kidney function, anemia, hemoglobin A1c, and urine analysis have been ordered to monitor the impact of diabetes on renal function. If there is no improvement in his blood glucose levels, additional medication may be considered during the next visit.    2. Hypertension.  He is currently taking amlodipine 5 mg twice a day. He reports no chest pain or shortness of breath. He is advised to continue his current medication regimen and monitor his blood pressure regularly. A referral to a cardiologist has been discussed due to his age and increased risk from diabetes and hypertension.    3. Hashimoto's Thyroiditis.  His thyroid function tests were normal.  No thyroid hormone is needed at this time.    4. Diabetic Neuropathy.  He reports occasional tingling in his legs when stretched out, likely due to pinching. No numbness or tingling in the feet was reported during the visit.    5. Diabetic Retinopathy.  He has  very mild diabetic retinopathy in both eyes. He is advised to follow up with his eye doctor annually.    Follow-up  The patient will follow up in 6 months with preceding hemoglobin A1c, CMP, CBC, lipid panel, and urine microalbumin to creatinine ratio.        CC: Diabetes type II, thyroid, blood pressure follow-up    History of Present Illness    HPI: Edmund Larose is a 74-year-old male with type 2 diabetes, Hashimoto's thyroiditis, and hypertension, here for a follow-up visit.     Diabetes complications include neuropathy. He has no nephropathy, retinopathy, heart attack, stroke, or claudication. His diabetes was diagnosed over 24 years ago. He is accompanied by his wife.    He is currently on Janumet 50/1000 mg twice daily for his diabetes. He reports no significant changes in his urinary habits, attributing frequent urination to his prostate condition. He does not experience nocturia but notes that he needs to urinate after prolonged periods of sitting. He finds sleeping on his left side more comfortable. He does not experience excessive thirst or hunger. His weight remains stable, fluctuating within a 3 to 5-pound range. He experiences bloating and gas, particularly after consuming cereal for breakfast, and avoids high-carbohydrate meals at dinner. He consumes 8 cups of coffee daily. He has been advised to increase his spinach intake. He has been incorporating more protein into his diet and has reduced his intake of sweets. He has been taking a supplement containing vitamin D, berberine, and cinnamon. He has been less active recently due to cold weather and acknowledges the need to increase his water intake.  He has been taking a supplement containing vitamin D, berberine, and cinnamon.     He reports no chest pain or shortness of breath. He expresses a desire to consult a cardiologist due to his age and existing conditions. He is on amlodipine 5 mg twice daily for his hypertension.    He does not experience  any numbness or tingling in his feet but reports occasional tingling when his legs are extended. He also experiences discomfort in his knees when they are elevated. He sustained an injury to his toe from a falling tool, which is currently healing. He has not had a recent consultation with a podiatrist. He maintains an active lifestyle, including regular walking.  Diabetic foot exam is due.    He reports no blurred vision and had his last ophthalmology appointment in December 2024.    Blood Sugar/Glucometer/Pump/CGM review: He monitors his blood glucose levels daily in the morning, which typically range from 160 to 200, occasionally dropping to low levels. He believes these fluctuations may be related to the timing of his medication intake.       Historical Information   Past Medical History:   Diagnosis Date    Diabetes mellitus (HCC)     Early satiety 01/24/2024    patient self report    Fatty liver     GERD (gastroesophageal reflux disease)     Hiatal hernia     Hypertension     Urinary urgency 01/24/2024    patient self report, pt states that he cannot remember if it is due to his bladder or his prostate     Past Surgical History:   Procedure Laterality Date    CATARACT EXTRACTION Right 11/2021    CATARACT EXTRACTION Left 2019     Social History   Social History     Substance and Sexual Activity   Alcohol Use Yes    Comment: social     Social History     Substance and Sexual Activity   Drug Use No     Social History     Tobacco Use   Smoking Status Never   Smokeless Tobacco Never     Family History:   Family History   Problem Relation Age of Onset    Hypertension Mother     Heart disease Father         lung collapsed, several heart attacks    Diabetes type II Sister     Hypertension Sister     Diabetes type II Sister     Hypertension Sister     [place]'s disease Sister     Hypertension Sister     Hypertension Sister     Hypertension Sister     Diabetes type II Brother     Heart disease Brother     Hypertension  Brother        Meds/Allergies   Current Outpatient Medications   Medication Sig Dispense Refill    amLODIPine (NORVASC) 5 mg tablet Take 5 mg by mouth 2 (two) times a day       Ascorbic Acid (Vitamin C) 500 MG CAPS Take by mouth daily      Calcium Carb-Cholecalciferol (Caltrate 600+D3) 600-800 MG-UNIT TABS Take by mouth 2 (two) times a day      glucose blood (OneTouch Ultra) test strip Use to test blood sugars once a day 100 strip 4    multivitamin-minerals (CENTRUM) tablet Take 1 tablet by mouth daily      Omega-3 Fatty Acids (FISH OIL) 1,000 mg Take 1,000 mg by mouth daily      sitaGLIPtin-metFORMIN (Janumet)  MG per tablet Take 1 tablet daily after breakfast and after supper 180 tablet 3    vitamin E, tocopherol, 400 units capsule Take 400 Units by mouth daily      bacitracin topical ointment 500 units/g topical ointment Apply 1 large application topically 2 (two) times a day for 7 days (Patient not taking: Reported on 3/13/2025) 14 g 0    ibuprofen (MOTRIN) 600 mg tablet Take 1 tablet (600 mg total) by mouth every 6 (six) hours as needed for mild pain (Patient not taking: Reported on 3/13/2025) 30 tablet 0    Specialty Vitamins Products (PROSTATE PO) Take by mouth 2 (two) times a day (Patient not taking: Reported on 9/13/2024)       No current facility-administered medications for this visit.     Allergies   Allergen Reactions    Actos [Pioglitazone] GI Intolerance     Significant GI upset       Objective   Vitals: Blood pressure 150/82, pulse 78, height 6' (1.829 m), weight 104 kg (230 lb).  Invasive Devices       None                   Physical Exam    Thyroid is normal in size. No palpable nodules.  Lungs are clear to auscultation.  Heart has a regular rate and rhythm. No murmurs.  No lower extremity edema. No ulcerations in the feet. Calluses on the heels. Dorsalis pedis and posterior tibialis pulses are 2+. Vibration sensation diminished to the first toe DIP joint bilaterally. Monofilament sensation  intact to both feet except to the right heel and diminished to the left heel.  Patient's shoes and socks removed.    Right Foot/Ankle   Right Foot Inspection  Skin Exam: skin normal, skin intact, callus and callus. No dry skin, no warmth, no erythema, no maceration, no abnormal color, no pre-ulcer and no ulcer.     Toe Exam: No swelling and  no right toe deformity    Sensory   Vibration: diminished  Monofilament testing: diminished    Vascular  The right DP pulse is 2+. The right PT pulse is 2+.     Left Foot/Ankle  Left Foot Inspection  Skin Exam: skin normal, skin intact and callus. No dry skin, no warmth, no erythema, no maceration, normal color, no pre-ulcer and no ulcer.     Toe Exam: No swelling and no left toe deformity.     Sensory   Vibration: diminished  Monofilament testing: diminished    Vascular  The left DP pulse is 2+. The left PT pulse is 2+.     Assign Risk Category  No deformity present  Loss of protective sensation  No weak pulses  Risk: 1        The history was obtained from the review of the chart and from the patient.    Lab Results:    Most recent Alc is  Lab Results   Component Value Date    HGBA1C 8.1 (H) 03/10/2025           Blood work performed on 3/10/2025 showed a CMP with a glucose of 172 fasting was otherwise normal.    TSH is 3.53 with a free T4 of 1.3.    Lab Results   Component Value Date    CREATININE 0.74 03/10/2025    CREATININE 0.80 09/11/2024    CREATININE 0.70 03/11/2024    BUN 15 03/10/2025    K 4.3 03/10/2025     03/10/2025    CO2 24 03/10/2025     eGFR   Date Value Ref Range Status   03/10/2025 96 > OR = 60 mL/min/1.73m2 Final   11/28/2023 86 ml/min/1.73sq m Final         Lab Results   Component Value Date    HDL 44 09/11/2024    TRIG 147 09/11/2024       Lab Results   Component Value Date    ALT 22 03/10/2025    AST 16 03/10/2025    ALKPHOS 57 03/10/2025       Lab Results   Component Value Date    TSH 3.53 03/10/2025    FREET4 1.3 03/10/2025             Future  Appointments   Date Time Provider Department Center   10/1/2025  2:20 PM Iza Bagley MD [facility]

## 2025-05-18 NOTE — PROGRESS NOTES
3/13/2024    Assessment/Plan     1. Type 2 diabetes mellitus with hyperglycemia, without long-term current use of insulin (Formerly [facility])  Assessment & Plan:  Most recent hemoglobin A1c is 7.5%, which does continue to improve and demonstrates fair control of his diabetes. For now, he will continue the same Janumet as he works on dietary changes and exercise and increasing water intake as the warmer weather ensues. He will continue to test his blood glucose up to once a day.    Orders:  -     Comprehensive metabolic panel; Future; Expected date: 08/05/2024  -     Hemoglobin A1C; Future; Expected date: 08/05/2024  -     Lipid Panel with Direct LDL reflex; Future; Expected date: 08/05/2024  -     TSH, 3rd generation; Future; Expected date: 08/05/2024  -     Albumin / creatinine urine ratio; Future; Expected date: 08/05/2024  -     T4, free; Future; Expected date: 08/05/2024    2. Diabetic polyneuropathy associated with type 2 diabetes mellitus (HCC)  Assessment & Plan:  He has stable neuropathic symptoms. Diabetic foot exam was performed in the office today.    Orders:  -     Comprehensive metabolic panel; Future; Expected date: 08/05/2024  -     Hemoglobin A1C; Future; Expected date: 08/05/2024  -     Lipid Panel with Direct LDL reflex; Future; Expected date: 08/05/2024  -     TSH, 3rd generation; Future; Expected date: 08/05/2024  -     Albumin / creatinine urine ratio; Future; Expected date: 08/05/2024  -     T4, free; Future; Expected date: 08/05/2024    3. Hashimoto's thyroiditis  Assessment & Plan:  He has some mild subclinical hypothyroidism, but he does not have any symptoms and does not want to start any thyroid hormone if it is not absolutely necessary. I will follow this over time.    Orders:  -     Comprehensive metabolic panel; Future; Expected date: 08/05/2024  -     Hemoglobin A1C; Future; Expected date: 08/05/2024  -     Lipid Panel with Direct LDL reflex; Future; Expected date: 08/05/2024  -     TSH, 3rd  Shift Hours: 0700 - 1900     Assessment:  Body systems that were not at patient's baseline Cognitive/Behavioral/Neuro, Gastrointestinal, Genitourinary, Skin, and Musculoskeletal. Focused body system assessments documented in flowsheets.        Activity              Fall Risk Score: 110              Bed alarm on? Yes     Activity Assistance Provided: assistance, 2 people      Assistive Device Utilized: walker, gait belt    Pain: denies    Labs/RN Managed Protocols: no RN managed labs, BG q4h stable     Lines/Drains: PIV X2 SL, NGT w/ continuous TF @ goal rate of 45 ml/hr    Nutrition: npo/TF    Goal Outcome Evaluation:      Plan of Care Reviewed With: patient, family    Overall Patient Progress: no changeOverall Patient Progress: no change    Outcome Evaluation: No acute events this shift. Neuros unchanged, no verbal communication this shift, some yes/no head shakes this shift. Hanh wound cares completed. Turning q2h, patient frequently moving self in bed independently.    Barriers to Discharge:   Ongoing POC           generation; Future; Expected date: 08/05/2024  -     Albumin / creatinine urine ratio; Future; Expected date: 08/05/2024  -     T4, free; Future; Expected date: 08/05/2024    4. Primary hypertension  Assessment & Plan:  He is normotensive in the office on his current dose of amlodipine.    Orders:  -     Comprehensive metabolic panel; Future; Expected date: 08/05/2024  -     Hemoglobin A1C; Future; Expected date: 08/05/2024  -     Lipid Panel with Direct LDL reflex; Future; Expected date: 08/05/2024  -     TSH, 3rd generation; Future; Expected date: 08/05/2024  -     Albumin / creatinine urine ratio; Future; Expected date: 08/05/2024  -     T4, free; Future; Expected date: 08/05/2024    5. Type 2 diabetes mellitus with complication (HCC)  -     sitaGLIPtin-metFORMIN (Janumet)  MG per tablet; Take 1 tablet daily after breakfast and after supper         I have asked him to follow up in 6 months.    CC: Diabetes type II, thyroid, blood pressure follow-up    History of Present Illness     HPI: Edmund Larose is a 73 y.o. year old male Edmund Salinas is a 73-year-old male with type 2 diabetes with neuropathy diagnosed over 23 years ago, Hashimoto's thyroiditis, and hypertension for follow-up visit.    Diabetic complications include diabetic neuropathy. He denies nephropathy, retinopathy, heart attack, stroke, or claudication. He is accompanied by his wife.    He is taking Janumet 50/1000 twice a day.  He narrates that he experiences polyuria mainly when she changes positions from sitting to standing, while seated on the couch, and for a few hours following dinner. He denies nocturia, polydipsia, polyphagia, diarrhea or constipation.    Hypoglycemic episodes: NO.    Blood Sugar/Glucometer/Pump/CGM review: He checks his blood glucose once a day in the morning.   Before breakfast: His blood glucose is in the 130 to 170 range.    He was referred to a specialized therapy program for his dizziness, which alleviated his  symptoms. He reports feeling full easily for the past 20 years and expresses frustration with persistent bloating and discomfort. He experiences gastrointestinal issues and discomfort. He is making efforts to improve his lifestyle, but when he craves snacks, he is provided with sugar-free options. He experiences intermittent pain across various areas of the stomach and has noticed a bump near his belly button.    He takes amlodipine 5 mg twice a day for his blood pressure. He denies any lightheadedness or headaches. He denies any chest pain or dyspnea.    The patient's last eye exam was in 11/2023. He denies any blurry vision. During her ophthalmologist visit, mild proliferation   diabetic retinopathy was noted in her left eye.     The patient's last foot exam was in February 2023 at the endocrine office.  He denies any numbness or tingling in his feet. He denies any wounds on his feet.        Last A1C was   Lab Results   Component Value Date    HGBA1C 7.5 (H) 03/11/2024   .        Review of Systems  The pertinent positive and negative findings are as noted in the HPI.    Historical Information   Past Medical History:   Diagnosis Date    Diabetes mellitus (HCC)     Early satiety 01/24/2024    patient self report    Fatty liver     GERD (gastroesophageal reflux disease)     Hiatal hernia     Hypertension     Urinary urgency 01/24/2024    patient self report, pt states that he cannot remember if it is due to his bladder or his prostate     Past Surgical History:   Procedure Laterality Date    CATARACT EXTRACTION Right 11/2021    CATARACT EXTRACTION Left 2019     Social History   Social History     Substance and Sexual Activity   Alcohol Use Yes    Comment: social     Social History     Substance and Sexual Activity   Drug Use No     Social History     Tobacco Use   Smoking Status Never   Smokeless Tobacco Never     Family History:   Family History   Problem Relation Age of Onset    Hypertension Mother     Heart  disease Father         lung collapsed, several heart attacks    Diabetes type II Sister     Hypertension Sister     Diabetes type II Sister     Hypertension Sister     [place]'s disease Sister     Hypertension Sister     Hypertension Sister     Hypertension Sister     Diabetes type II Brother     Heart disease Brother     Hypertension Brother        Meds/Allergies   Current Outpatient Medications   Medication Sig Dispense Refill    amLODIPine (NORVASC) 5 mg tablet Take 5 mg by mouth 2 (two) times a day       Ascorbic Acid (Vitamin C) 500 MG CAPS Take by mouth daily      Calcium Carb-Cholecalciferol (Caltrate 600+D3) 600-800 MG-UNIT TABS Take by mouth 2 (two) times a day      glucose blood (OneTouch Ultra) test strip Use to test blood sugars once a day 100 strip 4    ibuprofen (MOTRIN) 600 mg tablet Take 1 tablet (600 mg total) by mouth every 6 (six) hours as needed for mild pain 30 tablet 0    multivitamin-minerals (CENTRUM) tablet Take 1 tablet by mouth daily      Omega-3 Fatty Acids (FISH OIL) 1,000 mg Take 1,000 mg by mouth daily      sitaGLIPtin-metFORMIN (Janumet)  MG per tablet Take 1 tablet daily after breakfast and after supper 180 tablet 3    Specialty Vitamins Products (PROSTATE PO) Take by mouth 2 (two) times a day      vitamin E, tocopherol, 400 units capsule Take 400 Units by mouth daily      bacitracin topical ointment 500 units/g topical ointment Apply 1 large application topically 2 (two) times a day for 7 days 14 g 0     No current facility-administered medications for this visit.     Allergies   Allergen Reactions    Actos [Pioglitazone] GI Intolerance     Significant GI upset       Objective   Vitals: Blood pressure 138/68, pulse 73, height 6' (1.829 m), weight 104 kg (229 lb), SpO2 97%.  Invasive Devices       None                   Physical Exam  Cardiovascular:      Pulses: no weak pulses.           Dorsalis pedis pulses are 2+ on the right side and 2+ on the left side.        Posterior  tibial pulses are 2+ on the right side and 2+ on the left side.   Feet:      Right foot:      Skin integrity: No ulcer, skin breakdown, erythema, warmth, callus or dry skin.      Left foot:      Skin integrity: No ulcer, skin breakdown, erythema, warmth, callus or dry skin.       Physical exam normal with pertinent positives and negatives.  Neck: Thyroid normal in size. No palpable nodules.  Respiratory: Lungs are clear.  Cardiovascular: Heart regular. No murmurs.  Diabetic foot exam: No edema or swelling in the feet. No ulcerations in the feet. Dorsalis pedis and posterior tibialis pulses 2+. Vibration sensation is diminished to the first toe DIP joint bilaterally. Monofilament sensation intact to both feet.  Patient's shoes and socks removed.    Right Foot/Ankle   Right Foot Inspection  Skin Exam: skin normal and skin intact. No dry skin, no warmth, no callus, no erythema, no maceration, no abnormal color, no pre-ulcer, no ulcer and no callus.     Toe Exam: No swelling and  no right toe deformity    Sensory   Vibration: diminished  Monofilament testing: intact    Vascular  Capillary refills: < 3 seconds  The right DP pulse is 2+. The right PT pulse is 2+.     Left Foot/Ankle  Left Foot Inspection  Skin Exam: skin normal and skin intact. No dry skin, no warmth, no erythema, no maceration, normal color, no pre-ulcer, no ulcer and no callus.     Toe Exam: No swelling and no left toe deformity.     Sensory   Vibration: diminished  Monofilament testing: intact    Vascular  Capillary refills: < 3 seconds  The left DP pulse is 2+. The left PT pulse is 2+.     Assign Risk Category  No deformity present  Loss of protective sensation  No weak pulses  Risk: 1        The history was obtained from the review of the chart and from the patient and wife.    Lab Results:    Most recent Alc is  Lab Results   Component Value Date    HGBA1C 7.5 (H) 03/11/2024               Lab Results   Component Value Date    CREATININE 0.70  03/11/2024    CREATININE 0.89 11/28/2023    CREATININE 0.81 08/18/2023    BUN 13 03/11/2024    K 4.5 03/11/2024     03/11/2024    CO2 25 03/11/2024     eGFR   Date Value Ref Range Status   03/11/2024 99 > OR = 60 mL/min/1.73m2 Final   11/28/2023 86 ml/min/1.73sq m Final         Lab Results   Component Value Date    HDL 45 08/18/2023    TRIG 131 08/18/2023       Lab Results   Component Value Date    ALT 21 03/11/2024    AST 16 03/11/2024    ALKPHOS 65 03/11/2024       Lab Results   Component Value Date    TSH 5.02 (H) 03/11/2024    FREET4 1.1 03/11/2024         Blood work performed on 03/11/2024 showed a hemoglobin A1c of 7.5%.     TSH is 5.02 with a free T4 of 1.1.    CBC is normal.    CMP showed a glucose of 185 random but was otherwise normal.    Future Appointments   Date Time Provider Department Center   9/13/2024  1:00 PM Iza Bagley MD [facility]       Transcribed for Iza Bagley MD, by Farnaz William on 03/13/24 at 5:11 PM. Powered by [employer].

## 2025-07-03 ENCOUNTER — TELEPHONE (OUTPATIENT)
Dept: ENDOCRINOLOGY | Facility: HOSPITAL | Age: 75
End: 2025-07-03

## 2025-07-03 DIAGNOSIS — E11.8 TYPE 2 DIABETES MELLITUS WITH COMPLICATION (HCC): ICD-10-CM

## 2025-07-03 RX ORDER — BLOOD SUGAR DIAGNOSTIC
STRIP MISCELLANEOUS
Qty: 100 STRIP | Refills: 4 | Status: SHIPPED | OUTPATIENT
Start: 2025-07-03

## 2025-07-03 NOTE — TELEPHONE ENCOUNTER
Patient requesting a refill of his one touch ultra blue test strips.    Please send to [facility] in [place].    Pt has appt scheduled for October 2025

## 2025-07-12 ENCOUNTER — TRANSCRIBE ORDERS (OUTPATIENT)
Dept: ADMINISTRATIVE | Facility: HOSPITAL | Age: 75
End: 2025-07-12

## 2025-07-12 DIAGNOSIS — I35.1 AORTIC VALVE INSUFFICIENCY, ETIOLOGY OF CARDIAC VALVE DISEASE UNSPECIFIED: Primary | ICD-10-CM

## 2025-07-30 ENCOUNTER — HOSPITAL ENCOUNTER (OUTPATIENT)
Dept: HOSPITAL 99 - CATH | Age: 75
Discharge: HOME | End: 2025-07-30
Payer: MEDICARE

## 2025-07-30 VITALS — DIASTOLIC BLOOD PRESSURE: 67 MMHG | SYSTOLIC BLOOD PRESSURE: 148 MMHG

## 2025-07-30 VITALS — SYSTOLIC BLOOD PRESSURE: 135 MMHG | DIASTOLIC BLOOD PRESSURE: 64 MMHG

## 2025-07-30 VITALS — SYSTOLIC BLOOD PRESSURE: 135 MMHG | DIASTOLIC BLOOD PRESSURE: 87 MMHG

## 2025-07-30 VITALS — BODY MASS INDEX: 30 KG/M2

## 2025-07-30 VITALS
RESPIRATION RATE: 18 BRPM | DIASTOLIC BLOOD PRESSURE: 83 MMHG | TEMPERATURE: 97.8 F | HEART RATE: 72 BPM | OXYGEN SATURATION: 98 % | SYSTOLIC BLOOD PRESSURE: 156 MMHG

## 2025-07-30 VITALS — DIASTOLIC BLOOD PRESSURE: 69 MMHG | SYSTOLIC BLOOD PRESSURE: 142 MMHG

## 2025-07-30 VITALS — OXYGEN SATURATION: 2 % | SYSTOLIC BLOOD PRESSURE: 158 MMHG | DIASTOLIC BLOOD PRESSURE: 77 MMHG

## 2025-07-30 VITALS — DIASTOLIC BLOOD PRESSURE: 77 MMHG | SYSTOLIC BLOOD PRESSURE: 158 MMHG

## 2025-07-30 VITALS — SYSTOLIC BLOOD PRESSURE: 137 MMHG | DIASTOLIC BLOOD PRESSURE: 61 MMHG

## 2025-07-30 VITALS — SYSTOLIC BLOOD PRESSURE: 133 MMHG | DIASTOLIC BLOOD PRESSURE: 60 MMHG

## 2025-07-30 VITALS — DIASTOLIC BLOOD PRESSURE: 67 MMHG | SYSTOLIC BLOOD PRESSURE: 133 MMHG

## 2025-07-30 DIAGNOSIS — Z79.899: ICD-10-CM

## 2025-07-30 DIAGNOSIS — Z79.82: ICD-10-CM

## 2025-07-30 DIAGNOSIS — E11.9: ICD-10-CM

## 2025-07-30 DIAGNOSIS — I10: ICD-10-CM

## 2025-07-30 DIAGNOSIS — Z79.02: ICD-10-CM

## 2025-07-30 DIAGNOSIS — I25.10: Primary | ICD-10-CM

## 2025-07-30 LAB
BUN SERPL-MCNC: 14 MG/DL (ref 9–20)
CALCIUM SERPL-MCNC: 9.3 MG/DL (ref 8.4–10.2)
CHLORIDE SERPL-SCNC: 109 MMOL/L (ref 98–107)
CO2 SERPL-SCNC: 25 MMOL/L (ref 22–30)
COMMENT: (no result)
CREAT SERPL-MCNC: 0.6 MG/DL (ref 0.7–1.3)
EGFR: > 60
ERYTHROCYTE [DISTWIDTH] IN BLOOD BY AUTOMATED COUNT: 13 % (ref 11.5–14.5)
ESTIMATED CREATININE CLEARANCE: 120 ML/MIN
GLUCOSE - POINT OF CARE: 203 MG/DL (ref 70–99)
GLUCOSE SERPL-MCNC: 197 MG/DL (ref 70–99)
HCT VFR BLD AUTO: 40.5 % (ref 39–52)
HGB BLD-MCNC: 13.9 G/DL (ref 13–18)
MCH RBC QN AUTO: 29.3 PG (ref 27–31)
MCHC RBC AUTO-ENTMCNC: 34.3 G/DL (ref 33–37)
MCV RBC AUTO: 85.3 FL (ref 80–94)
PLATELET # BLD AUTO: 224 10^3/UL (ref 130–400)
PMV BLD AUTO: 9.6 FL (ref 7.4–10.4)
POTASSIUM SERPL-SCNC: 4.2 MMOL/L (ref 3.5–5.1)
RBC # BLD AUTO: 4.75 10^6/UL (ref 4.7–6.1)
SODIUM SERPL-SCNC: 141 MMOL/L (ref 135–145)
WBC # BLD AUTO: 9.5 10^3/UL (ref 4.8–10.8)

## 2025-07-30 PROCEDURE — C1894 INTRO/SHEATH, NON-LASER: HCPCS

## 2025-07-30 PROCEDURE — 99283 EMERGENCY DEPT VISIT LOW MDM: CPT

## 2025-07-30 RX ADMIN — ASPIRIN 325 MG: 325 TABLET ORAL at 07:51

## 2025-07-31 ENCOUNTER — HOSPITAL ENCOUNTER (EMERGENCY)
Facility: HOSPITAL | Age: 75
Discharge: HOME | End: 2025-07-31
Attending: EMERGENCY MEDICINE | Admitting: EMERGENCY MEDICINE
Payer: COMMERCIAL

## 2025-07-31 DIAGNOSIS — T78.40XA ALLERGIC REACTION TO DRUG, INITIAL ENCOUNTER: ICD-10-CM

## 2025-07-31 DIAGNOSIS — R60.0 PERIORBITAL EDEMA: Primary | ICD-10-CM

## 2025-07-31 LAB
ALBUMIN SERPL BCG-MCNC: 4.2 G/DL (ref 3.5–5)
ALP SERPL-CCNC: 53 U/L (ref 34–104)
ALT SERPL W P-5'-P-CCNC: 17 U/L (ref 7–52)
ANION GAP SERPL CALCULATED.3IONS-SCNC: 9 MMOL/L (ref 4–13)
AST SERPL W P-5'-P-CCNC: 14 U/L (ref 13–39)
BASOPHILS # BLD AUTO: 0.04 THOUSANDS/ÂΜL (ref 0–0.1)
BASOPHILS NFR BLD AUTO: 0 % (ref 0–1)
BILIRUB SERPL-MCNC: 0.67 MG/DL (ref 0.2–1)
BUN SERPL-MCNC: 12 MG/DL (ref 5–25)
CALCIUM SERPL-MCNC: 9 MG/DL (ref 8.4–10.2)
CHLORIDE SERPL-SCNC: 105 MMOL/L (ref 96–108)
CO2 SERPL-SCNC: 24 MMOL/L (ref 21–32)
CREAT SERPL-MCNC: 0.74 MG/DL (ref 0.6–1.3)
EOSINOPHIL # BLD AUTO: 0.45 THOUSAND/ÂΜL (ref 0–0.61)
EOSINOPHIL NFR BLD AUTO: 3 % (ref 0–6)
ERYTHROCYTE [DISTWIDTH] IN BLOOD BY AUTOMATED COUNT: 13.2 % (ref 11.6–15.1)
GFR SERPL CREATININE-BSD FRML MDRD: 91 ML/MIN/1.73SQ M
GLUCOSE SERPL-MCNC: 187 MG/DL (ref 65–140)
HCT VFR BLD AUTO: 45.3 % (ref 36.5–49.3)
HGB BLD-MCNC: 15 G/DL (ref 12–17)
IMM GRANULOCYTES # BLD AUTO: 0.08 THOUSAND/UL (ref 0–0.2)
IMM GRANULOCYTES NFR BLD AUTO: 1 % (ref 0–2)
LYMPHOCYTES # BLD AUTO: 1.1 THOUSANDS/ÂΜL (ref 0.6–4.47)
LYMPHOCYTES NFR BLD AUTO: 7 % (ref 14–44)
MCH RBC QN AUTO: 29 PG (ref 26.8–34.3)
MCHC RBC AUTO-ENTMCNC: 33.1 G/DL (ref 31.4–37.4)
MCV RBC AUTO: 88 FL (ref 82–98)
MONOCYTES # BLD AUTO: 0.61 THOUSAND/ÂΜL (ref 0.17–1.22)
MONOCYTES NFR BLD AUTO: 4 % (ref 4–12)
NEUTROPHILS # BLD AUTO: 12.5 THOUSANDS/ÂΜL (ref 1.85–7.62)
NEUTS SEG NFR BLD AUTO: 85 % (ref 43–75)
NRBC BLD AUTO-RTO: 0 /100 WBCS
PLATELET # BLD AUTO: 258 THOUSANDS/UL (ref 149–390)
PMV BLD AUTO: 10 FL (ref 8.9–12.7)
POTASSIUM SERPL-SCNC: 3.9 MMOL/L (ref 3.5–5.3)
PROT SERPL-MCNC: 6.8 G/DL (ref 6.4–8.4)
RBC # BLD AUTO: 5.17 MILLION/UL (ref 3.88–5.62)
SODIUM SERPL-SCNC: 138 MMOL/L (ref 135–147)
WBC # BLD AUTO: 14.78 THOUSAND/UL (ref 4.31–10.16)

## 2025-07-31 PROCEDURE — 99284 EMERGENCY DEPT VISIT MOD MDM: CPT | Performed by: EMERGENCY MEDICINE

## 2025-07-31 PROCEDURE — 36415 COLL VENOUS BLD VENIPUNCTURE: CPT | Performed by: EMERGENCY MEDICINE

## 2025-07-31 PROCEDURE — 80053 COMPREHEN METABOLIC PANEL: CPT | Performed by: EMERGENCY MEDICINE

## 2025-07-31 PROCEDURE — 85025 COMPLETE CBC W/AUTO DIFF WBC: CPT | Performed by: EMERGENCY MEDICINE

## 2025-07-31 PROCEDURE — 96374 THER/PROPH/DIAG INJ IV PUSH: CPT

## 2025-07-31 RX ORDER — FEXOFENADINE HCL 180 MG/1
180 TABLET ORAL DAILY
Qty: 7 TABLET | Refills: 0 | Status: SHIPPED | OUTPATIENT
Start: 2025-07-31 | End: 2025-08-07

## 2025-07-31 RX ORDER — FAMOTIDINE 20 MG/1
20 TABLET, FILM COATED ORAL 2 TIMES DAILY
Qty: 14 TABLET | Refills: 0 | Status: SHIPPED | OUTPATIENT
Start: 2025-07-31 | End: 2025-08-07

## 2025-07-31 RX ORDER — CLOPIDOGREL BISULFATE 75 MG/1
75 TABLET ORAL ONCE
Status: DISCONTINUED | OUTPATIENT
Start: 2025-07-31 | End: 2025-07-31 | Stop reason: HOSPADM

## 2025-07-31 RX ORDER — FAMOTIDINE 20 MG/1
20 TABLET, FILM COATED ORAL ONCE
Status: COMPLETED | OUTPATIENT
Start: 2025-07-31 | End: 2025-07-31

## 2025-07-31 RX ORDER — DIPHENHYDRAMINE HYDROCHLORIDE 50 MG/ML
50 INJECTION, SOLUTION INTRAMUSCULAR; INTRAVENOUS ONCE
Status: COMPLETED | OUTPATIENT
Start: 2025-07-31 | End: 2025-07-31

## 2025-07-31 RX ADMIN — FAMOTIDINE 20 MG: 20 TABLET, FILM COATED ORAL at 02:34

## 2025-07-31 RX ADMIN — DIPHENHYDRAMINE HYDROCHLORIDE 50 MG: 50 INJECTION, SOLUTION INTRAMUSCULAR; INTRAVENOUS at 01:23
